# Patient Record
Sex: FEMALE | Race: WHITE | NOT HISPANIC OR LATINO | Employment: OTHER | URBAN - METROPOLITAN AREA
[De-identification: names, ages, dates, MRNs, and addresses within clinical notes are randomized per-mention and may not be internally consistent; named-entity substitution may affect disease eponyms.]

---

## 2023-03-13 DIAGNOSIS — R73.01 IFG (IMPAIRED FASTING GLUCOSE): Primary | ICD-10-CM

## 2023-03-13 RX ORDER — METFORMIN HYDROCHLORIDE 500 MG/1
500 TABLET, EXTENDED RELEASE ORAL 2 TIMES DAILY
COMMUNITY
End: 2023-03-13 | Stop reason: SDUPTHER

## 2023-03-15 DIAGNOSIS — R73.01 IFG (IMPAIRED FASTING GLUCOSE): ICD-10-CM

## 2023-03-15 RX ORDER — METFORMIN HYDROCHLORIDE 500 MG/1
500 TABLET, EXTENDED RELEASE ORAL 2 TIMES DAILY
Qty: 180 TABLET | Refills: 1 | Status: SHIPPED | OUTPATIENT
Start: 2023-03-15 | End: 2023-09-12 | Stop reason: SDUPTHER

## 2023-04-25 DIAGNOSIS — E11.9 TYPE 2 DIABETES MELLITUS WITHOUT COMPLICATION, WITHOUT LONG-TERM CURRENT USE OF INSULIN (MULTI): ICD-10-CM

## 2023-04-25 DIAGNOSIS — E03.9 HYPOTHYROIDISM, UNSPECIFIED TYPE: ICD-10-CM

## 2023-05-01 ENCOUNTER — LAB (OUTPATIENT)
Dept: LAB | Facility: LAB | Age: 53
End: 2023-05-01
Payer: COMMERCIAL

## 2023-05-01 DIAGNOSIS — E11.9 TYPE 2 DIABETES MELLITUS WITHOUT COMPLICATION, WITHOUT LONG-TERM CURRENT USE OF INSULIN (MULTI): ICD-10-CM

## 2023-05-01 DIAGNOSIS — E03.9 HYPOTHYROIDISM, UNSPECIFIED TYPE: ICD-10-CM

## 2023-05-01 LAB
ALANINE AMINOTRANSFERASE (SGPT) (U/L) IN SER/PLAS: 26 U/L (ref 7–45)
ALBUMIN (G/DL) IN SER/PLAS: 4.3 G/DL (ref 3.4–5)
ALKALINE PHOSPHATASE (U/L) IN SER/PLAS: 33 U/L (ref 33–110)
ANION GAP IN SER/PLAS: 13 MMOL/L (ref 10–20)
APPEARANCE, URINE: ABNORMAL
ASPARTATE AMINOTRANSFERASE (SGOT) (U/L) IN SER/PLAS: 20 U/L (ref 9–39)
BACTERIA, URINE: ABNORMAL /HPF
BASOPHILS (10*3/UL) IN BLOOD BY AUTOMATED COUNT: 0.04 X10E9/L (ref 0–0.1)
BASOPHILS/100 LEUKOCYTES IN BLOOD BY AUTOMATED COUNT: 0.5 % (ref 0–2)
BILIRUBIN TOTAL (MG/DL) IN SER/PLAS: 0.5 MG/DL (ref 0–1.2)
BILIRUBIN, URINE: NEGATIVE
BLOOD, URINE: NEGATIVE
CALCIUM (MG/DL) IN SER/PLAS: 9.2 MG/DL (ref 8.6–10.3)
CARBON DIOXIDE, TOTAL (MMOL/L) IN SER/PLAS: 26 MMOL/L (ref 21–32)
CHLORIDE (MMOL/L) IN SER/PLAS: 102 MMOL/L (ref 98–107)
CHOLESTEROL (MG/DL) IN SER/PLAS: 123 MG/DL (ref 0–199)
CHOLESTEROL IN HDL (MG/DL) IN SER/PLAS: 58.5 MG/DL
CHOLESTEROL/HDL RATIO: 2.1
COLOR, URINE: YELLOW
CREATININE (MG/DL) IN SER/PLAS: 1 MG/DL (ref 0.5–1.05)
EOSINOPHILS (10*3/UL) IN BLOOD BY AUTOMATED COUNT: 0.13 X10E9/L (ref 0–0.7)
EOSINOPHILS/100 LEUKOCYTES IN BLOOD BY AUTOMATED COUNT: 1.7 % (ref 0–6)
ERYTHROCYTE DISTRIBUTION WIDTH (RATIO) BY AUTOMATED COUNT: 12.5 % (ref 11.5–14.5)
ERYTHROCYTE MEAN CORPUSCULAR HEMOGLOBIN CONCENTRATION (G/DL) BY AUTOMATED: 32.6 G/DL (ref 32–36)
ERYTHROCYTE MEAN CORPUSCULAR VOLUME (FL) BY AUTOMATED COUNT: 93 FL (ref 80–100)
ERYTHROCYTES (10*6/UL) IN BLOOD BY AUTOMATED COUNT: 4.31 X10E12/L (ref 4–5.2)
ESTIMATED AVERAGE GLUCOSE FOR HBA1C: 128 MG/DL
GFR FEMALE: 67 ML/MIN/1.73M2
GLUCOSE (MG/DL) IN SER/PLAS: 100 MG/DL (ref 74–99)
GLUCOSE, URINE: NEGATIVE MG/DL
HEMATOCRIT (%) IN BLOOD BY AUTOMATED COUNT: 39.9 % (ref 36–46)
HEMOGLOBIN (G/DL) IN BLOOD: 13 G/DL (ref 12–16)
HEMOGLOBIN A1C/HEMOGLOBIN TOTAL IN BLOOD: 6.1 %
IMMATURE GRANULOCYTES/100 LEUKOCYTES IN BLOOD BY AUTOMATED COUNT: 0.3 % (ref 0–0.9)
KETONES, URINE: NEGATIVE MG/DL
LDL: 46 MG/DL (ref 0–99)
LEUKOCYTE ESTERASE, URINE: ABNORMAL
LEUKOCYTES (10*3/UL) IN BLOOD BY AUTOMATED COUNT: 7.6 X10E9/L (ref 4.4–11.3)
LYMPHOCYTES (10*3/UL) IN BLOOD BY AUTOMATED COUNT: 1.03 X10E9/L (ref 1.2–4.8)
LYMPHOCYTES/100 LEUKOCYTES IN BLOOD BY AUTOMATED COUNT: 13.6 % (ref 13–44)
MONOCYTES (10*3/UL) IN BLOOD BY AUTOMATED COUNT: 0.66 X10E9/L (ref 0.1–1)
MONOCYTES/100 LEUKOCYTES IN BLOOD BY AUTOMATED COUNT: 8.7 % (ref 2–10)
MUCUS, URINE: ABNORMAL /LPF
NEUTROPHILS (10*3/UL) IN BLOOD BY AUTOMATED COUNT: 5.7 X10E9/L (ref 1.2–7.7)
NEUTROPHILS/100 LEUKOCYTES IN BLOOD BY AUTOMATED COUNT: 75.2 % (ref 40–80)
NITRITE, URINE: NEGATIVE
PH, URINE: 6 (ref 5–8)
PLATELETS (10*3/UL) IN BLOOD AUTOMATED COUNT: 291 X10E9/L (ref 150–450)
POTASSIUM (MMOL/L) IN SER/PLAS: 4.4 MMOL/L (ref 3.5–5.3)
PROTEIN TOTAL: 6.6 G/DL (ref 6.4–8.2)
PROTEIN, URINE: NEGATIVE MG/DL
RBC, URINE: 3 /HPF (ref 0–5)
SODIUM (MMOL/L) IN SER/PLAS: 137 MMOL/L (ref 136–145)
SPECIFIC GRAVITY, URINE: 1.02 (ref 1–1.03)
SQUAMOUS EPITHELIAL CELLS, URINE: 5 /HPF
THYROTROPIN (MIU/L) IN SER/PLAS BY DETECTION LIMIT <= 0.05 MIU/L: 1.02 MIU/L (ref 0.44–3.98)
TRIGLYCERIDE (MG/DL) IN SER/PLAS: 94 MG/DL (ref 0–149)
UREA NITROGEN (MG/DL) IN SER/PLAS: 12 MG/DL (ref 6–23)
UROBILINOGEN, URINE: <2 MG/DL (ref 0–1.9)
VLDL: 19 MG/DL (ref 0–40)
WBC, URINE: 12 /HPF (ref 0–5)

## 2023-05-01 PROCEDURE — 80061 LIPID PANEL: CPT

## 2023-05-01 PROCEDURE — 36415 COLL VENOUS BLD VENIPUNCTURE: CPT

## 2023-05-01 PROCEDURE — 81001 URINALYSIS AUTO W/SCOPE: CPT

## 2023-05-01 PROCEDURE — 85025 COMPLETE CBC W/AUTO DIFF WBC: CPT

## 2023-05-01 PROCEDURE — 84443 ASSAY THYROID STIM HORMONE: CPT

## 2023-05-01 PROCEDURE — 80053 COMPREHEN METABOLIC PANEL: CPT

## 2023-05-01 PROCEDURE — 83036 HEMOGLOBIN GLYCOSYLATED A1C: CPT

## 2023-05-08 ENCOUNTER — OFFICE VISIT (OUTPATIENT)
Dept: PRIMARY CARE | Facility: CLINIC | Age: 53
End: 2023-05-08
Payer: COMMERCIAL

## 2023-05-08 VITALS
WEIGHT: 173.5 LBS | SYSTOLIC BLOOD PRESSURE: 126 MMHG | BODY MASS INDEX: 29.62 KG/M2 | DIASTOLIC BLOOD PRESSURE: 90 MMHG | HEART RATE: 91 BPM | HEIGHT: 64 IN | RESPIRATION RATE: 16 BRPM | OXYGEN SATURATION: 99 %

## 2023-05-08 DIAGNOSIS — Z12.11 SCREENING FOR COLON CANCER: Primary | ICD-10-CM

## 2023-05-08 DIAGNOSIS — R73.01 IFG (IMPAIRED FASTING GLUCOSE): ICD-10-CM

## 2023-05-08 PROBLEM — M54.17 LUMBOSACRAL NEURITIS: Status: ACTIVE | Noted: 2023-05-08

## 2023-05-08 PROBLEM — H93.8X1 SENSATION OF PLUGGED EAR ON RIGHT SIDE: Status: ACTIVE | Noted: 2023-05-08

## 2023-05-08 PROBLEM — J06.9 ACUTE URI: Status: ACTIVE | Noted: 2023-05-08

## 2023-05-08 PROBLEM — R13.10 DYSPHAGIA: Status: ACTIVE | Noted: 2023-05-08

## 2023-05-08 PROBLEM — E03.9 HYPOTHYROID: Status: ACTIVE | Noted: 2023-05-08

## 2023-05-08 PROBLEM — M79.10 MYALGIA: Status: ACTIVE | Noted: 2023-05-08

## 2023-05-08 PROBLEM — R73.9 BLOOD GLUCOSE ELEVATED: Status: ACTIVE | Noted: 2023-05-08

## 2023-05-08 PROBLEM — S89.90XA LEG INJURY: Status: ACTIVE | Noted: 2023-05-08

## 2023-05-08 PROBLEM — M25.569 JOINT PAIN, KNEE: Status: ACTIVE | Noted: 2023-05-08

## 2023-05-08 PROBLEM — H44.009 EYE INFECTION: Status: ACTIVE | Noted: 2023-05-08

## 2023-05-08 PROBLEM — E78.5 ELEVATED LIPIDS: Status: ACTIVE | Noted: 2023-05-08

## 2023-05-08 PROBLEM — F41.9 ANXIETY: Status: ACTIVE | Noted: 2023-05-08

## 2023-05-08 PROBLEM — E11.9 DIABETES MELLITUS TYPE 2, UNCOMPLICATED (MULTI): Status: ACTIVE | Noted: 2023-05-08

## 2023-05-08 PROBLEM — L30.9 DERMATITIS: Status: ACTIVE | Noted: 2023-05-08

## 2023-05-08 PROBLEM — J04.0 LARYNGITIS: Status: ACTIVE | Noted: 2023-05-08

## 2023-05-08 PROBLEM — N92.4 ABNORMAL PERIMENOPAUSAL BLEEDING: Status: ACTIVE | Noted: 2023-05-08

## 2023-05-08 PROCEDURE — 3074F SYST BP LT 130 MM HG: CPT | Performed by: INTERNAL MEDICINE

## 2023-05-08 PROCEDURE — 99396 PREV VISIT EST AGE 40-64: CPT | Performed by: INTERNAL MEDICINE

## 2023-05-08 PROCEDURE — 1036F TOBACCO NON-USER: CPT | Performed by: INTERNAL MEDICINE

## 2023-05-08 PROCEDURE — 3080F DIAST BP >= 90 MM HG: CPT | Performed by: INTERNAL MEDICINE

## 2023-05-08 PROCEDURE — 3044F HG A1C LEVEL LT 7.0%: CPT | Performed by: INTERNAL MEDICINE

## 2023-05-08 RX ORDER — LEVOTHYROXINE SODIUM 75 UG/1
75 TABLET ORAL DAILY
COMMUNITY
End: 2023-09-12 | Stop reason: SDUPTHER

## 2023-05-08 RX ORDER — ALBUTEROL SULFATE 90 UG/1
AEROSOL, METERED RESPIRATORY (INHALATION)
COMMUNITY
Start: 2019-09-24

## 2023-05-08 RX ORDER — HYDROCORTISONE 25 MG/G
CREAM TOPICAL
COMMUNITY
Start: 2022-12-07

## 2023-05-08 RX ORDER — ACETAMINOPHEN 500 MG
1 TABLET ORAL DAILY
COMMUNITY
Start: 2022-06-27

## 2023-05-08 RX ORDER — TOPIRAMATE 25 MG/1
25 TABLET ORAL 2 TIMES DAILY
COMMUNITY
Start: 2019-06-04 | End: 2024-05-24 | Stop reason: ALTCHOICE

## 2023-05-08 RX ORDER — NORETHINDRONE ACETATE AND ETHINYL ESTRADIOL, ETHINYL ESTRADIOL AND FERROUS FUMARATE 1MG-10(24)
KIT ORAL
COMMUNITY

## 2023-05-08 RX ORDER — CLONAZEPAM 0.25 MG/1
TABLET, ORALLY DISINTEGRATING ORAL
COMMUNITY
Start: 2019-05-18 | End: 2024-05-24 | Stop reason: ALTCHOICE

## 2023-05-08 RX ORDER — NITROFURANTOIN 25; 75 MG/1; MG/1
100 CAPSULE ORAL 2 TIMES DAILY
Qty: 10 CAPSULE | Refills: 0 | Status: SHIPPED | OUTPATIENT
Start: 2023-05-08 | End: 2023-05-17 | Stop reason: SDUPTHER

## 2023-05-08 ASSESSMENT — ENCOUNTER SYMPTOMS
LOSS OF SENSATION IN FEET: 0
DEPRESSION: 0
OCCASIONAL FEELINGS OF UNSTEADINESS: 0

## 2023-05-08 ASSESSMENT — PATIENT HEALTH QUESTIONNAIRE - PHQ9
SUM OF ALL RESPONSES TO PHQ9 QUESTIONS 1 AND 2: 0
1. LITTLE INTEREST OR PLEASURE IN DOING THINGS: NOT AT ALL
2. FEELING DOWN, DEPRESSED OR HOPELESS: NOT AT ALL

## 2023-05-08 NOTE — PROGRESS NOTES
"Subjective   Patient ID: Madie Jones is a 52 y.o. female who presents for Annual Exam.    CPE :    52 F    Doing well    IFG-HBA1C= 6.1    Hypothyroid    Covid x 2    HPI     Review of Systems      No Fever/chills/headaches/dizziness/chest pains/ shortness of breath/palpitations/Nausea/vomiting/diarrhea/ constipation/urine frequency/blood in urine.    Dark urine-     Objective   /90 (BP Location: Left arm, Patient Position: Sitting, BP Cuff Size: Adult)   Pulse 91   Resp 16   Ht 1.635 m (5' 4.37\")   Wt 78.7 kg (173 lb 8 oz)   SpO2 99%   BMI 29.44 kg/m²     Physical Exam  No JVP elevation. No palpable Lymph Nodes. No Thyromegaly    CVS-NL S1/S2 . No MRG    Lungs-CTA. B/S= B/L    Abdomen-Soft, Non-tender. No masses or HSM    Extremities: No C/C/E        Assessment/Plan        IFG-HBA1C= 6.1    Hypothyroid    Covid x 2    Plan:    CRC    Macrobid-100 mg BID X 5 days    Continue with Metformin  mg BID    ? Prevnar 20    Gyn/Mammogram    Continue with current Rx    Follow up in 12 months/PRN      "

## 2023-05-17 ENCOUNTER — TELEPHONE (OUTPATIENT)
Dept: PRIMARY CARE | Facility: CLINIC | Age: 53
End: 2023-05-17
Payer: COMMERCIAL

## 2023-05-17 DIAGNOSIS — Z12.11 SCREENING FOR COLON CANCER: ICD-10-CM

## 2023-05-17 RX ORDER — NITROFURANTOIN 25; 75 MG/1; MG/1
100 CAPSULE ORAL 2 TIMES DAILY
Qty: 14 CAPSULE | Refills: 0 | Status: SHIPPED | OUTPATIENT
Start: 2023-05-17 | End: 2023-05-24

## 2023-05-17 NOTE — TELEPHONE ENCOUNTER
Patient said at last appointment on May 8th she was given UTI medication and still is experiencing symptoms, irritation while urinating. Patient states she is going out of town tomorrow so she would like additional medication called in as soon as possible    Please call and advise patient [phone]    Nash Tremaine Gargdhurst

## 2023-08-31 RX ORDER — TRIAMCINOLONE ACETONIDE 55 UG/1
1 SPRAY, METERED NASAL DAILY
COMMUNITY
Start: 2018-10-08

## 2023-08-31 RX ORDER — CEPHALEXIN 250 MG/1
1 CAPSULE ORAL EVERY 12 HOURS
COMMUNITY
Start: 2022-10-10 | End: 2024-05-24 | Stop reason: ALTCHOICE

## 2023-09-12 ENCOUNTER — TELEPHONE (OUTPATIENT)
Dept: PRIMARY CARE | Facility: CLINIC | Age: 53
End: 2023-09-12
Payer: COMMERCIAL

## 2023-09-12 DIAGNOSIS — E78.5 ELEVATED LIPIDS: ICD-10-CM

## 2023-09-12 DIAGNOSIS — R73.01 IFG (IMPAIRED FASTING GLUCOSE): ICD-10-CM

## 2023-09-12 RX ORDER — METFORMIN HYDROCHLORIDE 500 MG/1
500 TABLET, EXTENDED RELEASE ORAL 2 TIMES DAILY
Qty: 180 TABLET | Refills: 3 | Status: SHIPPED | OUTPATIENT
Start: 2023-09-12 | End: 2024-05-24 | Stop reason: SDUPTHER

## 2023-09-12 RX ORDER — LEVOTHYROXINE SODIUM 75 UG/1
75 TABLET ORAL DAILY
Qty: 90 TABLET | Refills: 3 | Status: SHIPPED | OUTPATIENT
Start: 2023-09-12 | End: 2024-09-11

## 2023-09-12 NOTE — TELEPHONE ENCOUNTER
PT called and stated she had called about her prescription that has been coming through Dr HSU from her endocrinologist.    RX: Metformin 500 mg

## 2023-11-30 ENCOUNTER — ANCILLARY PROCEDURE (OUTPATIENT)
Dept: RADIOLOGY | Facility: CLINIC | Age: 53
End: 2023-11-30
Payer: COMMERCIAL

## 2023-11-30 DIAGNOSIS — Z12.39 ENCOUNTER FOR OTHER SCREENING FOR MALIGNANT NEOPLASM OF BREAST: ICD-10-CM

## 2023-11-30 PROCEDURE — 77067 SCR MAMMO BI INCL CAD: CPT | Mod: BILATERAL PROCEDURE | Performed by: RADIOLOGY

## 2023-11-30 PROCEDURE — 77067 SCR MAMMO BI INCL CAD: CPT

## 2023-11-30 PROCEDURE — 77063 BREAST TOMOSYNTHESIS BI: CPT | Mod: BILATERAL PROCEDURE | Performed by: RADIOLOGY

## 2023-12-04 DIAGNOSIS — Z12.11 COLON CANCER SCREENING: Primary | ICD-10-CM

## 2023-12-04 RX ORDER — SODIUM, POTASSIUM,MAG SULFATES 17.5-3.13G
SOLUTION, RECONSTITUTED, ORAL ORAL
Qty: 1 EACH | Refills: 0 | Status: SHIPPED | OUTPATIENT
Start: 2023-12-04 | End: 2024-04-23 | Stop reason: SDUPTHER

## 2023-12-11 ENCOUNTER — HOSPITAL ENCOUNTER (OUTPATIENT)
Dept: GASTROENTEROLOGY | Facility: HOSPITAL | Age: 53
Setting detail: OUTPATIENT SURGERY
Discharge: HOME | End: 2023-12-11
Payer: COMMERCIAL

## 2023-12-11 ENCOUNTER — ANESTHESIA (OUTPATIENT)
Dept: GASTROENTEROLOGY | Facility: HOSPITAL | Age: 53
End: 2023-12-11
Payer: COMMERCIAL

## 2023-12-11 ENCOUNTER — ANESTHESIA EVENT (OUTPATIENT)
Dept: GASTROENTEROLOGY | Facility: HOSPITAL | Age: 53
End: 2023-12-11
Payer: COMMERCIAL

## 2023-12-11 VITALS
DIASTOLIC BLOOD PRESSURE: 74 MMHG | WEIGHT: 180 LBS | RESPIRATION RATE: 16 BRPM | OXYGEN SATURATION: 100 % | TEMPERATURE: 97.2 F | BODY MASS INDEX: 30.73 KG/M2 | HEART RATE: 64 BPM | SYSTOLIC BLOOD PRESSURE: 121 MMHG | HEIGHT: 64 IN

## 2023-12-11 DIAGNOSIS — Z12.11 SCREENING FOR COLON CANCER: ICD-10-CM

## 2023-12-11 DIAGNOSIS — Z83.719 FAMILY HX COLONIC POLYPS: Primary | ICD-10-CM

## 2023-12-11 PROBLEM — R11.2 PONV (POSTOPERATIVE NAUSEA AND VOMITING): Status: ACTIVE | Noted: 2023-12-11

## 2023-12-11 PROBLEM — Z98.890 PONV (POSTOPERATIVE NAUSEA AND VOMITING): Status: ACTIVE | Noted: 2023-12-11

## 2023-12-11 LAB
GLUCOSE BLD MANUAL STRIP-MCNC: 107 MG/DL (ref 74–99)
GLUCOSE BLD MANUAL STRIP-MCNC: 111 MG/DL (ref 74–99)

## 2023-12-11 PROCEDURE — 7100000010 HC PHASE TWO TIME - EACH INCREMENTAL 1 MINUTE

## 2023-12-11 PROCEDURE — 82947 ASSAY GLUCOSE BLOOD QUANT: CPT

## 2023-12-11 PROCEDURE — 2720000007 HC OR 272 NO HCPCS

## 2023-12-11 PROCEDURE — 88305 TISSUE EXAM BY PATHOLOGIST: CPT | Performed by: PATHOLOGY

## 2023-12-11 PROCEDURE — A45378 PR COLONOSCOPY,DIAGNOSTIC: Performed by: ANESTHESIOLOGIST ASSISTANT

## 2023-12-11 PROCEDURE — 2500000004 HC RX 250 GENERAL PHARMACY W/ HCPCS (ALT 636 FOR OP/ED): Performed by: ANESTHESIOLOGIST ASSISTANT

## 2023-12-11 PROCEDURE — 45385 COLONOSCOPY W/LESION REMOVAL: CPT | Performed by: INTERNAL MEDICINE

## 2023-12-11 PROCEDURE — A45378 PR COLONOSCOPY,DIAGNOSTIC: Performed by: STUDENT IN AN ORGANIZED HEALTH CARE EDUCATION/TRAINING PROGRAM

## 2023-12-11 PROCEDURE — 45390 COLONOSCOPY W/RESECTION: CPT | Performed by: INTERNAL MEDICINE

## 2023-12-11 PROCEDURE — 0753T DGTZ GLS MCRSCP SLD LEVEL IV: CPT | Mod: TC,SUR,AHULAB | Performed by: INTERNAL MEDICINE

## 2023-12-11 PROCEDURE — 2500000005 HC RX 250 GENERAL PHARMACY W/O HCPCS: Performed by: ANESTHESIOLOGIST ASSISTANT

## 2023-12-11 PROCEDURE — 3700000002 HC GENERAL ANESTHESIA TIME - EACH INCREMENTAL 1 MINUTE

## 2023-12-11 PROCEDURE — 3700000001 HC GENERAL ANESTHESIA TIME - INITIAL BASE CHARGE

## 2023-12-11 PROCEDURE — 7100000009 HC PHASE TWO TIME - INITIAL BASE CHARGE

## 2023-12-11 RX ORDER — ONDANSETRON HYDROCHLORIDE 2 MG/ML
INJECTION, SOLUTION INTRAVENOUS AS NEEDED
Status: DISCONTINUED | OUTPATIENT
Start: 2023-12-11 | End: 2023-12-11

## 2023-12-11 RX ORDER — PROPOFOL 10 MG/ML
INJECTION, EMULSION INTRAVENOUS CONTINUOUS PRN
Status: DISCONTINUED | OUTPATIENT
Start: 2023-12-11 | End: 2023-12-11

## 2023-12-11 RX ORDER — SODIUM CHLORIDE, SODIUM LACTATE, POTASSIUM CHLORIDE, CALCIUM CHLORIDE 600; 310; 30; 20 MG/100ML; MG/100ML; MG/100ML; MG/100ML
20 INJECTION, SOLUTION INTRAVENOUS CONTINUOUS
Status: DISCONTINUED | OUTPATIENT
Start: 2023-12-11 | End: 2023-12-12 | Stop reason: HOSPADM

## 2023-12-11 RX ORDER — MIDAZOLAM HYDROCHLORIDE 1 MG/ML
INJECTION INTRAMUSCULAR; INTRAVENOUS AS NEEDED
Status: DISCONTINUED | OUTPATIENT
Start: 2023-12-11 | End: 2023-12-11

## 2023-12-11 RX ORDER — LIDOCAINE HYDROCHLORIDE 20 MG/ML
INJECTION, SOLUTION EPIDURAL; INFILTRATION; INTRACAUDAL; PERINEURAL AS NEEDED
Status: DISCONTINUED | OUTPATIENT
Start: 2023-12-11 | End: 2023-12-11

## 2023-12-11 RX ADMIN — LIDOCAINE HYDROCHLORIDE 100 MG: 20 INJECTION, SOLUTION EPIDURAL; INFILTRATION; INTRACAUDAL; PERINEURAL at 10:26

## 2023-12-11 RX ADMIN — MIDAZOLAM HYDROCHLORIDE 2 MG: 1 INJECTION INTRAMUSCULAR; INTRAVENOUS at 10:23

## 2023-12-11 RX ADMIN — SODIUM CHLORIDE, SODIUM LACTATE, POTASSIUM CHLORIDE, AND CALCIUM CHLORIDE: 600; 310; 30; 20 INJECTION, SOLUTION INTRAVENOUS at 10:22

## 2023-12-11 RX ADMIN — GLYCOPYRROLATE 0.1 MCG: 0.2 INJECTION, SOLUTION INTRAMUSCULAR; INTRAVITREAL at 10:23

## 2023-12-11 RX ADMIN — PROPOFOL 175 MCG/KG/MIN: 10 INJECTION, EMULSION INTRAVENOUS at 10:26

## 2023-12-11 RX ADMIN — ONDANSETRON 4 MG: 2 INJECTION, SOLUTION INTRAMUSCULAR; INTRAVENOUS at 10:51

## 2023-12-11 SDOH — HEALTH STABILITY: MENTAL HEALTH: CURRENT SMOKER: 0

## 2023-12-11 ASSESSMENT — PAIN SCALES - GENERAL
PAINLEVEL_OUTOF10: 0 - NO PAIN

## 2023-12-11 ASSESSMENT — COLUMBIA-SUICIDE SEVERITY RATING SCALE - C-SSRS
6. HAVE YOU EVER DONE ANYTHING, STARTED TO DO ANYTHING, OR PREPARED TO DO ANYTHING TO END YOUR LIFE?: NO
1. IN THE PAST MONTH, HAVE YOU WISHED YOU WERE DEAD OR WISHED YOU COULD GO TO SLEEP AND NOT WAKE UP?: NO
2. HAVE YOU ACTUALLY HAD ANY THOUGHTS OF KILLING YOURSELF?: NO

## 2023-12-11 ASSESSMENT — PAIN - FUNCTIONAL ASSESSMENT
PAIN_FUNCTIONAL_ASSESSMENT: 0-10

## 2023-12-11 ASSESSMENT — ENCOUNTER SYMPTOMS: CONSTITUTIONAL NEGATIVE: 1

## 2023-12-11 NOTE — POST-PROCEDURE NOTE
1053: Patient to bay with anesthesia present, plan of care reviewed. Report received from TINY BERRY. Initial assessment complete.    1100: Dr. Sandy to bedside to speak with pt.    1105: pt family back to bedside. Pt tolerating kari crackers and sips of water. Post procedure .    1123: discharge instructions reviewed with pt and family. Pt and family verbalized understanding.    1125: pt family assisted pt with dressing.     1130: IV removed without difficulty.  pt tolerated well. Cath intact upon removal.    1133:pt transported to [facility] via

## 2023-12-11 NOTE — ANESTHESIA PREPROCEDURE EVALUATION
Patient: Madie Jones    Procedure Information       Date/Time: 12/11/23 1050    Scheduled providers: James Sandy DO; Pita New MA; Jenn Kaur MD; LUIS MANUEL Maldonado; Ml Soto RN    Procedure: COLONOSCOPY    Location: [facility]            Relevant Problems   Anesthesia   (+) PONV (postoperative nausea and vomiting)      Cardiovascular (within normal limits)      Endocrine   (+) Diabetes mellitus type 2, uncomplicated (CMS/HCC)   (+) Hypothyroid      /Renal (within normal limits)      Neuro/Psych   (+) Anxiety   (+) Lumbosacral neuritis      Infectious Disease   (+) Acute URI   (+) Eye infection       Clinical information reviewed:   Tobacco  Allergies  Meds   Med Hx  Surg Hx   Fam Hx  Soc Hx        NPO Detail:  NPO/Void Status  Date of Last Liquid: 12/11/23  Time of Last Liquid: 0450  Date of Last Solid: 12/10/23  Time of Last Solid: 0800  Last Intake Type: GI prep  Time of Last Void: 0600       There were no vitals filed for this visit.    Past Surgical History:   Procedure Laterality Date    BREAST SURGERY  01/27/2016    Breast Surgery Reduction Procedure Bilateral    CHOLECYSTECTOMY  01/27/2016    Cholecystectomy    MOUTH SURGERY  01/27/2016    Oral Surgery Tooth Extraction    OTHER SURGICAL HISTORY  01/27/2016    Repair Of Superficial Wound Upper Extrem Finger(S)     Past Medical History:   Diagnosis Date    Abnormal weight gain 11/17/2018    Abnormal weight gain    History of bilateral breast reduction surgery 2016    Parvovirus infection, unspecified     Parvovirus infection    Personal history of other diseases of the musculoskeletal system and connective tissue 08/02/2018    History of joint pain    Personal history of other diseases of the respiratory system 01/07/2015    History of pharyngitis    Personal history of other diseases of the respiratory system 08/02/2018    History of sore throat    Personal history of other infectious and  parasitic diseases 05/04/2016    History of herpes zoster    PONV (postoperative nausea and vomiting)        Current Outpatient Medications:     cholecalciferol (Vitamin D-3) 50 mcg (2,000 unit) capsule, Take 1 capsule (50 mcg) by mouth once daily., Disp: , Rfl:     L. acidophilus/Bifid. animalis 32 billion cell capsule, Take by mouth., Disp: , Rfl:     Lactobacillus acidophilus (PROBIOTIC ORAL), Take by mouth. Use as directed, Disp: , Rfl:     levothyroxine (Synthroid) 75 mcg tablet, Take 1 tablet (75 mcg) by mouth once daily., Disp: 90 tablet, Rfl: 3    Lo Loestrin Fe 1 mg-10 mcg (24)/10 mcg (2) tablet, TAKE ONE TABLET BY MOUTH EVERY DAY. SKIP INACTIVE WEEK, Disp: , Rfl:     metFORMIN  mg 24 hr tablet, Take 1 tablet (500 mg) by mouth 2 times a day., Disp: 180 tablet, Rfl: 3    sodium,potassium,mag sulfates (Suprep Bowel Prep Kit) 17.5-3.13-1.6 gram recon soln solution, Take one bottle twice as directed by the prep instructions, Disp: 1 each, Rfl: 0    triamcinolone (Nasacort) 55 mcg nasal inhaler, Administer 1 spray into affected nostril(s) once daily., Disp: , Rfl:     albuterol 90 mcg/actuation inhaler, Inhale., Disp: , Rfl:     cephalexin (Keflex) 250 mg capsule, Take 1 capsule (250 mg) by mouth every 12 hours., Disp: , Rfl:     clonazePAM (KlonoPIN) 0.25 mg disintegrating tablet, DISSOLVE 1 TO 2 TABLETS IN MOUTH AT BEDTIME AS NEEDED., Disp: , Rfl:     hydrocortisone 2.5 % cream, APPLY TO RASH ON FACE 2 TIMES A DAY AS NEEDED FOR 2 WEEKS, Disp: , Rfl:     topiramate (Topamax) 25 mg tablet, Take 1 tablet (25 mg) by mouth twice a day., Disp: , Rfl:     Current Facility-Administered Medications:     lactated Ringer's infusion, 20 mL/hr, intravenous, Continuous, James Sandy, DO  Prior to Admission medications    Medication Sig Start Date End Date Taking? Authorizing Provider   cholecalciferol (Vitamin D-3) 50 mcg (2,000 unit) capsule Take 1 capsule (50 mcg) by mouth once daily. 6/27/22  Yes Historical  Provider, MD   L. acidophilus/Bifid. animalis 32 billion cell capsule Take by mouth. 10/8/18  Yes Historical Provider, MD   Lactobacillus acidophilus (PROBIOTIC ORAL) Take by mouth. Use as directed   Yes Historical Provider, MD   levothyroxine (Synthroid) 75 mcg tablet Take 1 tablet (75 mcg) by mouth once daily. 9/12/23 9/11/24 Yes Harsha Robins MD   Lo Loestrin Fe 1 mg-10 mcg (24)/10 mcg (2) tablet TAKE ONE TABLET BY MOUTH EVERY DAY. SKIP INACTIVE WEEK   Yes Historical Provider, MD   metFORMIN  mg 24 hr tablet Take 1 tablet (500 mg) by mouth 2 times a day. 9/12/23 9/11/24 Yes Harsha Robins MD   sodium,potassium,mag sulfates (Suprep Bowel Prep Kit) 17.5-3.13-1.6 gram recon soln solution Take one bottle twice as directed by the prep instructions 12/4/23  Yes James Sandy, DO   triamcinolone (Nasacort) 55 mcg nasal inhaler Administer 1 spray into affected nostril(s) once daily. 10/8/18  Yes Historical Provider, MD   albuterol 90 mcg/actuation inhaler Inhale. 9/24/19   Historical Provider, MD   cephalexin (Keflex) 250 mg capsule Take 1 capsule (250 mg) by mouth every 12 hours. 10/10/22   Historical Provider, MD   clonazePAM (KlonoPIN) 0.25 mg disintegrating tablet DISSOLVE 1 TO 2 TABLETS IN MOUTH AT BEDTIME AS NEEDED. 5/18/19   Historical Provider, MD   hydrocortisone 2.5 % cream APPLY TO RASH ON FACE 2 TIMES A DAY AS NEEDED FOR 2 WEEKS 12/7/22   Historical Provider, MD   topiramate (Topamax) 25 mg tablet Take 1 tablet (25 mg) by mouth twice a day. 6/4/19   Historical Provider, MD     Allergies   Allergen Reactions    Liraglutide Anxiety, Diarrhea and Nausea Only     Social History     Tobacco Use    Smoking status: Never    Smokeless tobacco: Never   Substance Use Topics    Alcohol use: Yes     Alcohol/week: 1.0 standard drink of alcohol     Types: 1 Shots of liquor per week     Comment: once a week         Chemistry    Lab Results   Component Value Date/Time     05/01/2023 1128    K 4.4 05/01/2023  "1128     05/01/2023 1128    CO2 26 05/01/2023 1128    BUN 12 05/01/2023 1128    CREATININE 1.00 05/01/2023 1128    Lab Results   Component Value Date/Time    CALCIUM 9.2 05/01/2023 1128    ALKPHOS 33 05/01/2023 1128    AST 20 05/01/2023 1128    ALT 26 05/01/2023 1128    BILITOT 0.5 05/01/2023 1128          Lab Results   Component Value Date/Time    WBC 7.6 05/01/2023 1128    HGB 13.0 05/01/2023 1128    HCT 39.9 05/01/2023 1128     05/01/2023 1128     No results found for: \"PROTIME\", \"PTT\", \"INR\"  No results found for this or any previous visit (from the past 4464 hour(s)).  No results found for this or any previous visit from the past 1095 days.    Physical Exam    Airway  Mallampati: II  TM distance: >3 FB  Neck ROM: full     Cardiovascular - normal exam     Dental    Pulmonary - normal exam     Abdominal - normal exam             Anesthesia Plan    ASA 2     MAC     The patient is not a current smoker.    intravenous induction   Anesthetic plan and risks discussed with patient.  Use of blood products discussed with patient who.    Plan discussed with CAA and attending.      "

## 2023-12-11 NOTE — DISCHARGE INSTRUCTIONS

## 2023-12-11 NOTE — ANESTHESIA POSTPROCEDURE EVALUATION
Patient: Madie Jones    Procedure Summary       Date: 12/11/23 Room / Location: [facility]    Anesthesia Start: 1022 Anesthesia Stop: 1057    Procedure: COLONOSCOPY Diagnosis:       Screening for colon cancer      Family hx colonic polyps    Scheduled Providers: James Sandy DO; Pita New MA; Jenn Kaur MD; LUIS MANUEL Maldonado; Ml Soto RN Responsible Provider: Jenn Kaur MD    Anesthesia Type: MAC ASA Status: 2            Anesthesia Type: MAC    Vitals Value Taken Time   /74 12/11/23 1125   Temp 36.2 °C (97.2 °F) 12/11/23 1053   Pulse 59 12/11/23 1125   Resp 16 12/11/23 1123   SpO2 97 % 12/11/23 1125   Vitals shown include unvalidated device data.    Anesthesia Post Evaluation    Patient location during evaluation: PACU  Patient participation: complete - patient participated  Level of consciousness: awake and alert  Pain management: adequate  Airway patency: patent  Cardiovascular status: acceptable  Respiratory status: acceptable  Hydration status: acceptable  Postoperative Nausea and Vomiting: none  Comments: Denies nausea         There were no known notable events for this encounter.

## 2023-12-11 NOTE — H&P
History Of Present Illness  Madie Jones is a 53 y.o. female presenting with open access screening colonoscopy.  One of her parents had colon polyps; no cancer.     Past Medical History  Past Medical History:   Diagnosis Date    Abnormal weight gain 11/17/2018    Abnormal weight gain    History of bilateral breast reduction surgery 2016    Parvovirus infection, unspecified     Parvovirus infection    Personal history of other diseases of the musculoskeletal system and connective tissue 08/02/2018    History of joint pain    Personal history of other diseases of the respiratory system 01/07/2015    History of pharyngitis    Personal history of other diseases of the respiratory system 08/02/2018    History of sore throat    Personal history of other infectious and parasitic diseases 05/04/2016    History of herpes zoster    PONV (postoperative nausea and vomiting)        Surgical History  Past Surgical History:   Procedure Laterality Date    BREAST SURGERY  01/27/2016    Breast Surgery Reduction Procedure Bilateral    CHOLECYSTECTOMY  01/27/2016    Cholecystectomy    MOUTH SURGERY  01/27/2016    Oral Surgery Tooth Extraction    OTHER SURGICAL HISTORY  01/27/2016    Repair Of Superficial Wound Upper Extrem Finger(S)        Social History  She reports that she has never smoked. She has never used smokeless tobacco. She reports current alcohol use of about 1.0 standard drink of alcohol per week. She reports that she does not use drugs.    Family History  Family History   Problem Relation Name Age of Onset    Hypothyroidism Mother      Other (cardiac arrythmia) Father      Skin cancer Father      Colon cancer Paternal Grandfather          Allergies  Liraglutide    Review of Systems   Constitutional: Negative.         Physical Exam  Constitutional:       Appearance: Normal appearance.   Cardiovascular:      Rate and Rhythm: Normal rate and regular rhythm.   Pulmonary:      Effort: Pulmonary effort is normal.      Breath  sounds: Normal breath sounds.   Abdominal:      Palpations: Abdomen is soft.   Neurological:      Mental Status: She is alert.          Last Recorded Vitals  There were no vitals taken for this visit.    Relevant Results             Assessment/Plan   Active Problems:  There are no active Hospital Problems.      Colonoscopy for family history of polyps and CRC screening       I spent 10 minutes in the professional and overall care of this patient.      James Sandy, DO

## 2023-12-12 ASSESSMENT — PAIN SCALES - GENERAL: PAINLEVEL_OUTOF10: 0 - NO PAIN

## 2023-12-15 LAB
LABORATORY COMMENT REPORT: NORMAL
PATH REPORT.FINAL DX SPEC: NORMAL
PATH REPORT.GROSS SPEC: NORMAL
PATH REPORT.TOTAL CANCER: NORMAL

## 2024-01-09 ENCOUNTER — OFFICE VISIT (OUTPATIENT)
Dept: SPORTS MEDICINE | Facility: HOSPITAL | Age: 54
End: 2024-01-09
Payer: COMMERCIAL

## 2024-01-09 DIAGNOSIS — M54.16 RADICULOPATHY OF LUMBAR REGION: Primary | ICD-10-CM

## 2024-01-09 PROCEDURE — 99213 OFFICE O/P EST LOW 20 MIN: CPT | Performed by: PHYSICIAN ASSISTANT

## 2024-01-09 PROCEDURE — 1036F TOBACCO NON-USER: CPT | Performed by: PHYSICIAN ASSISTANT

## 2024-01-09 RX ORDER — METHYLPREDNISOLONE 4 MG/1
TABLET ORAL
Qty: 1 EACH | Refills: 0 | Status: SHIPPED | OUTPATIENT
Start: 2024-01-09 | End: 2024-05-24 | Stop reason: ALTCHOICE

## 2024-01-09 NOTE — PROGRESS NOTES
Madie is a 53-year-old female reporting to clinic today for evaluation of her acute medial thigh pain.    Her symptoms started spontaneously 5 days ago, she denies injury or trauma.  She has pinpoint right medial thigh pain that she describes as a constant burning sensation.  She has found nothing that increases or decreases her symptoms.  She denies radiation of her pain.  She has no back pain.  No numbness and tingling.  She denies weakness, dry or tripping over her feet.  She has full control of her bowel and bladder.    She has a self-reported history of an L4-5 disc herniation.  This pain is similar to her previous nerve pain but in a different location.    Family, social, and medical histories are obtained and reviewed.  Prediabetic on metformin.    ROS: All other systems have been reviewed and are negative except as previously noted in history of present illness.    Const: Well-appearing, well-nourished female in no distress.  Eyes: Normal appearing sclera and conjunctiva, no jaundice, pupils normal in appearance.  Resp: breathing comfortably, normal respiratory rate.  CV: No upper or lower extremity edema.  Musculoskeletal: Normal gait.  Lumbar ROM is supple.  Strength exam of the lower extremities reveals 5/5 strength in all major muscle groups.  Negative straight leg raise bilaterally.  No swelling, bruising, or tenderness to palpation.  Neuro: Sensation is intact and equal bilaterally. Deep tendon reflexes are normal and symmetric.  No clonus.  Skin: Intact without any lesions, normal turgor.  Psych: Alert and oriented x3, normal mood and affect.    Her concern was ruling out a blood clot.  I discussed with her that I cannot rule this out here in clinic.  My suspicion for a blood clot is low.  I did discuss symptoms with her to look out for and immediately report to the ED if these are present.  In the meantime we are going to treat her acute radicular pain conservatively.  She was prescribed a Medrol  Dosepak for inflammation.  I am optimistic her symptoms will improve with conservative treatment.  She can follow-up with me on an as-needed basis.    **This note was dictated using speech recognition software and was not corrected for spelling or grammatical errors**

## 2024-04-23 DIAGNOSIS — Z12.11 COLON CANCER SCREENING: ICD-10-CM

## 2024-04-23 RX ORDER — SODIUM, POTASSIUM,MAG SULFATES 17.5-3.13G
SOLUTION, RECONSTITUTED, ORAL ORAL
Qty: 1 EACH | Refills: 0 | Status: SHIPPED | OUTPATIENT
Start: 2024-04-23

## 2024-05-10 ENCOUNTER — APPOINTMENT (OUTPATIENT)
Dept: PRIMARY CARE | Facility: CLINIC | Age: 54
End: 2024-05-10
Payer: COMMERCIAL

## 2024-05-17 ENCOUNTER — LAB (OUTPATIENT)
Dept: LAB | Facility: LAB | Age: 54
End: 2024-05-17
Payer: COMMERCIAL

## 2024-05-17 DIAGNOSIS — Z00.00 ROUTINE GENERAL MEDICAL EXAMINATION AT A HEALTH CARE FACILITY: ICD-10-CM

## 2024-05-17 LAB
ALBUMIN SERPL BCP-MCNC: 4.3 G/DL (ref 3.4–5)
ALP SERPL-CCNC: 34 U/L (ref 33–110)
ALT SERPL W P-5'-P-CCNC: 22 U/L (ref 7–45)
ANION GAP SERPL CALC-SCNC: 13 MMOL/L (ref 10–20)
APPEARANCE UR: ABNORMAL
AST SERPL W P-5'-P-CCNC: 16 U/L (ref 9–39)
BACTERIA #/AREA URNS AUTO: ABNORMAL /HPF
BASOPHILS # BLD AUTO: 0.06 X10*3/UL (ref 0–0.1)
BASOPHILS NFR BLD AUTO: 1 %
BILIRUB SERPL-MCNC: 0.6 MG/DL (ref 0–1.2)
BILIRUB UR STRIP.AUTO-MCNC: NEGATIVE MG/DL
BUN SERPL-MCNC: 17 MG/DL (ref 6–23)
CALCIUM SERPL-MCNC: 8.9 MG/DL (ref 8.6–10.3)
CHLORIDE SERPL-SCNC: 105 MMOL/L (ref 98–107)
CHOLEST SERPL-MCNC: 126 MG/DL (ref 0–199)
CHOLESTEROL/HDL RATIO: 2.3
CO2 SERPL-SCNC: 26 MMOL/L (ref 21–32)
COLOR UR: YELLOW
CREAT SERPL-MCNC: 1.05 MG/DL (ref 0.5–1.05)
EGFRCR SERPLBLD CKD-EPI 2021: 64 ML/MIN/1.73M*2
EOSINOPHIL # BLD AUTO: 0.25 X10*3/UL (ref 0–0.7)
EOSINOPHIL NFR BLD AUTO: 4.1 %
ERYTHROCYTE [DISTWIDTH] IN BLOOD BY AUTOMATED COUNT: 12.9 % (ref 11.5–14.5)
EST. AVERAGE GLUCOSE BLD GHB EST-MCNC: 134 MG/DL
GLUCOSE SERPL-MCNC: 114 MG/DL (ref 74–99)
GLUCOSE UR STRIP.AUTO-MCNC: NORMAL MG/DL
HBA1C MFR BLD: 6.3 %
HCT VFR BLD AUTO: 39.9 % (ref 36–46)
HDLC SERPL-MCNC: 54.8 MG/DL
HGB BLD-MCNC: 12.9 G/DL (ref 12–16)
IMM GRANULOCYTES # BLD AUTO: 0.02 X10*3/UL (ref 0–0.7)
IMM GRANULOCYTES NFR BLD AUTO: 0.3 % (ref 0–0.9)
KETONES UR STRIP.AUTO-MCNC: NEGATIVE MG/DL
LDLC SERPL CALC-MCNC: 53 MG/DL
LEUKOCYTE ESTERASE UR QL STRIP.AUTO: ABNORMAL
LYMPHOCYTES # BLD AUTO: 1.56 X10*3/UL (ref 1.2–4.8)
LYMPHOCYTES NFR BLD AUTO: 25.4 %
MCH RBC QN AUTO: 30.1 PG (ref 26–34)
MCHC RBC AUTO-ENTMCNC: 32.3 G/DL (ref 32–36)
MCV RBC AUTO: 93 FL (ref 80–100)
MONOCYTES # BLD AUTO: 0.41 X10*3/UL (ref 0.1–1)
MONOCYTES NFR BLD AUTO: 6.7 %
MUCOUS THREADS #/AREA URNS AUTO: ABNORMAL /LPF
NEUTROPHILS # BLD AUTO: 3.85 X10*3/UL (ref 1.2–7.7)
NEUTROPHILS NFR BLD AUTO: 62.5 %
NITRITE UR QL STRIP.AUTO: NEGATIVE
NON HDL CHOLESTEROL: 71 MG/DL (ref 0–149)
NRBC BLD-RTO: 0 /100 WBCS (ref 0–0)
PH UR STRIP.AUTO: 6.5 [PH]
PLATELET # BLD AUTO: 343 X10*3/UL (ref 150–450)
POTASSIUM SERPL-SCNC: 4.6 MMOL/L (ref 3.5–5.3)
PROT SERPL-MCNC: 6.4 G/DL (ref 6.4–8.2)
PROT UR STRIP.AUTO-MCNC: NEGATIVE MG/DL
RBC # BLD AUTO: 4.29 X10*6/UL (ref 4–5.2)
RBC # UR STRIP.AUTO: ABNORMAL /UL
RBC #/AREA URNS AUTO: ABNORMAL /HPF
SODIUM SERPL-SCNC: 139 MMOL/L (ref 136–145)
SP GR UR STRIP.AUTO: 1.02
SQUAMOUS #/AREA URNS AUTO: ABNORMAL /HPF
TRIGL SERPL-MCNC: 93 MG/DL (ref 0–149)
TSH SERPL-ACNC: 1.78 MIU/L (ref 0.44–3.98)
UROBILINOGEN UR STRIP.AUTO-MCNC: NORMAL MG/DL
VLDL: 19 MG/DL (ref 0–40)
WBC # BLD AUTO: 6.2 X10*3/UL (ref 4.4–11.3)
WBC #/AREA URNS AUTO: ABNORMAL /HPF

## 2024-05-17 PROCEDURE — 83036 HEMOGLOBIN GLYCOSYLATED A1C: CPT

## 2024-05-17 PROCEDURE — 36415 COLL VENOUS BLD VENIPUNCTURE: CPT

## 2024-05-17 PROCEDURE — 85025 COMPLETE CBC W/AUTO DIFF WBC: CPT

## 2024-05-17 PROCEDURE — 80053 COMPREHEN METABOLIC PANEL: CPT

## 2024-05-17 PROCEDURE — 81001 URINALYSIS AUTO W/SCOPE: CPT

## 2024-05-17 PROCEDURE — 84443 ASSAY THYROID STIM HORMONE: CPT

## 2024-05-17 PROCEDURE — 80061 LIPID PANEL: CPT

## 2024-05-24 ENCOUNTER — OFFICE VISIT (OUTPATIENT)
Dept: PRIMARY CARE | Facility: CLINIC | Age: 54
End: 2024-05-24
Payer: COMMERCIAL

## 2024-05-24 VITALS
OXYGEN SATURATION: 99 % | HEIGHT: 64 IN | BODY MASS INDEX: 29.71 KG/M2 | WEIGHT: 174 LBS | RESPIRATION RATE: 21 BRPM | HEART RATE: 84 BPM

## 2024-05-24 DIAGNOSIS — R73.01 IFG (IMPAIRED FASTING GLUCOSE): ICD-10-CM

## 2024-05-24 DIAGNOSIS — Z00.00 ROUTINE GENERAL MEDICAL EXAMINATION AT A HEALTH CARE FACILITY: Primary | ICD-10-CM

## 2024-05-24 DIAGNOSIS — R20.2 PARESTHESIA: ICD-10-CM

## 2024-05-24 PROCEDURE — 99396 PREV VISIT EST AGE 40-64: CPT | Performed by: INTERNAL MEDICINE

## 2024-05-24 PROCEDURE — 3048F LDL-C <100 MG/DL: CPT | Performed by: INTERNAL MEDICINE

## 2024-05-24 PROCEDURE — 3044F HG A1C LEVEL LT 7.0%: CPT | Performed by: INTERNAL MEDICINE

## 2024-05-24 PROCEDURE — 1036F TOBACCO NON-USER: CPT | Performed by: INTERNAL MEDICINE

## 2024-05-24 RX ORDER — METFORMIN HYDROCHLORIDE 500 MG/1
500 TABLET, EXTENDED RELEASE ORAL
Qty: 90 TABLET | Refills: 3 | Status: SHIPPED | OUTPATIENT
Start: 2024-05-24 | End: 2024-05-29 | Stop reason: SDUPTHER

## 2024-05-24 ASSESSMENT — PROMIS GLOBAL HEALTH SCALE
RATE_PHYSICAL_HEALTH: GOOD
RATE_SOCIAL_SATISFACTION: VERY GOOD
CARRYOUT_PHYSICAL_ACTIVITIES: MOSTLY
RATE_AVERAGE_PAIN: 3
RATE_GENERAL_HEALTH: GOOD
RATE_AVERAGE_FATIGUE: MILD
RATE_MENTAL_HEALTH: GOOD
RATE_QUALITY_OF_LIFE: VERY GOOD
EMOTIONAL_PROBLEMS: OFTEN
CARRYOUT_SOCIAL_ACTIVITIES: VERY GOOD

## 2024-05-24 ASSESSMENT — ENCOUNTER SYMPTOMS
OCCASIONAL FEELINGS OF UNSTEADINESS: 0
DEPRESSION: 0
LOSS OF SENSATION IN FEET: 0

## 2024-05-24 ASSESSMENT — PATIENT HEALTH QUESTIONNAIRE - PHQ9
1. LITTLE INTEREST OR PLEASURE IN DOING THINGS: NOT AT ALL
2. FEELING DOWN, DEPRESSED OR HOPELESS: NOT AT ALL
SUM OF ALL RESPONSES TO PHQ9 QUESTIONS 1 AND 2: 0

## 2024-05-24 NOTE — PROGRESS NOTES
"Subjective   Patient ID: Madie Jones is a 53 y.o. female who presents for CPE.    Prediabetes    Psoriasis    Alopecia             HPI     Review of Systems    Rash    Alopecia    No Fever/chills/headaches/dizziness/chest pains/ cough/ shortness of breath/palpitations/ abdominal pain /Nausea/vomiting/diarrhea/ constipation/urine frequency/blood in urine.    Lower back pain    Objective   Pulse 84   Resp 21   Ht 1.626 m (5' 4\")   Wt 78.9 kg (174 lb)   SpO2 99%   BMI 29.87 kg/m²     Physical Exam    No JVP elevation. No palpable Lymph Nodes. No Thyromegaly    HEENT- Negative    CVS-NL S1/S2 . No MRG    Lungs-CTA. B/S= B/L    Abdomen-Soft, Non-tender. No masses or HSM    Extremities: No C/C/E    Skin-No abnormal moles/    Rash-Psoriasis of elbows        Assessment/Plan        Prediabetes-UCC4K=4.3- Increase Metformin 500 mg -3 Tabs daily    Psoriasis/Alopecia -Follow up with dermatology    Hypothyroid-     Continue with current Rx    Follow up in 12 months /PRN           "

## 2024-05-28 DIAGNOSIS — R73.01 IFG (IMPAIRED FASTING GLUCOSE): ICD-10-CM

## 2024-05-28 RX ORDER — METFORMIN HYDROCHLORIDE 500 MG/1
500 TABLET, EXTENDED RELEASE ORAL 3 TIMES DAILY
Qty: 90 TABLET | Refills: 0 | OUTPATIENT
Start: 2024-05-28 | End: 2024-06-27

## 2024-05-28 NOTE — TELEPHONE ENCOUNTER
Pt stated prescription stated take 1 time daily, she stated its supposed to be taken 3 times daily. Also insurance only covers 30 days at a time.

## 2024-05-29 ENCOUNTER — TELEPHONE (OUTPATIENT)
Dept: PRIMARY CARE | Facility: CLINIC | Age: 54
End: 2024-05-29
Payer: COMMERCIAL

## 2024-05-29 DIAGNOSIS — R73.01 IFG (IMPAIRED FASTING GLUCOSE): ICD-10-CM

## 2024-05-29 RX ORDER — METFORMIN HYDROCHLORIDE 500 MG/1
500 TABLET, EXTENDED RELEASE ORAL 3 TIMES DAILY
Qty: 180 TABLET | Refills: 3 | Status: SHIPPED | OUTPATIENT
Start: 2024-05-29 | End: 2025-01-24

## 2024-05-29 NOTE — TELEPHONE ENCOUNTER
PT STATED METFORMIN WAS SENT OVER INCORRECTLY PATIENT IS TAKING TWO TABLETS IN THE MORNING AND ONE AT NIGHT PLEASE SEND NEW SCRIPT TO PHARMACY WITH CORRECT DIRECTION

## 2024-06-04 NOTE — PROGRESS NOTES
Madie Jones is a 53 y.o. female who is here for a routine exam. PCP = Harsha Robins MD    HPI: Patient presenting for annual exam. She has been on Loestrin for 2-3 yrs. She has been having spotting rarely, last one happened in March ( before that in January) In December and January she was having postcoital spotting, she hasn't had  this in the last 6 mo.    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  Seatbelt: always  Exercise: elliptical, walking and swimming  Sexual, physical, mental abuse: safe      Never smoked, alcohol social, no drugs     Past med hx and past surg hx reviewed and notable for: prediabetes on Metformin, Hypothyroidism on Synthroid 75 mcg, Psoriasis on Clobetasol          Social History     Socioeconomic History    Marital status:      Spouse name: Not on file    Number of children: Not on file    Years of education: Not on file    Highest education level: Not on file   Occupational History    Not on file   Tobacco Use    Smoking status: Never    Smokeless tobacco: Never   Vaping Use    Vaping status: Never Used   Substance and Sexual Activity    Alcohol use: Yes     Alcohol/week: 1.0 standard drink of alcohol     Types: 1 Shots of liquor per week     Comment: once a week    Drug use: Never    Sexual activity: Not on file   Other Topics Concern    Not on file   Social History Narrative    Not on file     Social Determinants of Health     Financial Resource Strain: Not on file   Food Insecurity: Not on file   Transportation Needs: Not on file   Physical Activity: Not on file   Stress: Not on file   Social Connections: Not on file   Intimate Partner Violence: Not on file   Housing Stability: Not on file       Objective   BP  "124/70 (BP Location: Right arm)   Ht 1.626 m (5' 4\")   Wt 80.5 kg (177 lb 6.4 oz)   BMI 30.45 kg/m²      General:   Alert and oriented, in no acute distress   Neck: Supple. No visible thyromegaly.    Breast/Axilla: Normal to palpation bilaterally without masses, skin changes, or nipple discharge.    Abdomen: Soft, non-tender, without masses or organomegaly   Vulva: Normal architecture without erythema, masses, or lesions.    Vagina: Normal mucosa without lesions, masses, or atrophy. No abnormal vaginal discharge.    Cervix: Small polyp noted, taken out using a cervical forceps, sent to pathology for evaluation Patient tolerated the procedure well.,   Uterus: Normal mobile, non-enlarged uterus    Adnexa: Normal without masses or lesions   Pelvic Floor No POP noted. No high tone pelvic floor    Psych Normal affect. Normal mood.        Annual  OCP- will transition to HT. Script sent  Mammogram   Pap and HPV  in 2027  She is getting a colonoscopy-December/2024  Lo Loestrin switched to Vivelle+ Progesterone  Cervical polypectomy, follow up with results      Orders Placed This Encounter   Procedures    BI mammo bilateral screening tomosynthesis     Standing Status:   Future     Standing Expiration Date:   8/6/2025     Order Specific Question:   Perform a breast ultrasound if clinically indicated by Radiologist?     Answer:   Yes     Order Specific Question:   Is the patient pregnant?     Answer:   No     Order Specific Question:   Previous Mamm performed at  location?     Answer:   Yes     Order Specific Question:   Reason for exam:     Answer:   Screening     Order Specific Question:   Radiologist to Determine Optimal Study     Answer:   Yes     Order Specific Question:   Release result to [facility]     Answer:   Immediate [1]     Order Specific Question:   Is this exam part of a Research Study? If Yes, link this order to the research study     Answer:   No       Problem List Items Addressed This Visit    None  Visit " Diagnoses       Well woman exam with routine gynecological exam    -  Primary    Relevant Orders    BI mammo bilateral screening tomosynthesis    Cervical polyp        Relevant Orders    Surgical Pathology Exam    Menopausal symptoms        Relevant Medications    estradiol (Vivelle-DOT) 0.05 mg/24 hr patch    progesterone (Prometrium) 100 mg capsule            Thank you for coming to your annual exam.     Seen and d/w Dr. Monica Shelby MD PGY1

## 2024-06-06 ENCOUNTER — OFFICE VISIT (OUTPATIENT)
Dept: OBSTETRICS AND GYNECOLOGY | Facility: CLINIC | Age: 54
End: 2024-06-06
Payer: COMMERCIAL

## 2024-06-06 VITALS
BODY MASS INDEX: 30.29 KG/M2 | WEIGHT: 177.4 LBS | HEIGHT: 64 IN | SYSTOLIC BLOOD PRESSURE: 124 MMHG | DIASTOLIC BLOOD PRESSURE: 70 MMHG

## 2024-06-06 DIAGNOSIS — Z01.419 WELL WOMAN EXAM WITH ROUTINE GYNECOLOGICAL EXAM: Primary | ICD-10-CM

## 2024-06-06 DIAGNOSIS — N84.1 CERVICAL POLYP: ICD-10-CM

## 2024-06-06 DIAGNOSIS — N95.1 MENOPAUSAL SYMPTOMS: ICD-10-CM

## 2024-06-06 PROCEDURE — 3048F LDL-C <100 MG/DL: CPT | Performed by: OBSTETRICS & GYNECOLOGY

## 2024-06-06 PROCEDURE — 3074F SYST BP LT 130 MM HG: CPT | Performed by: OBSTETRICS & GYNECOLOGY

## 2024-06-06 PROCEDURE — 3078F DIAST BP <80 MM HG: CPT | Performed by: OBSTETRICS & GYNECOLOGY

## 2024-06-06 PROCEDURE — 88305 TISSUE EXAM BY PATHOLOGIST: CPT

## 2024-06-06 PROCEDURE — 1036F TOBACCO NON-USER: CPT | Performed by: OBSTETRICS & GYNECOLOGY

## 2024-06-06 PROCEDURE — 3044F HG A1C LEVEL LT 7.0%: CPT | Performed by: OBSTETRICS & GYNECOLOGY

## 2024-06-06 PROCEDURE — 99396 PREV VISIT EST AGE 40-64: CPT | Performed by: OBSTETRICS & GYNECOLOGY

## 2024-06-06 RX ORDER — CLOBETASOL PROPIONATE 0.5 MG/G
1 OINTMENT TOPICAL DAILY
COMMUNITY
Start: 2023-07-27

## 2024-06-06 RX ORDER — ESTRADIOL 0.05 MG/D
1 FILM, EXTENDED RELEASE TRANSDERMAL 2 TIMES WEEKLY
Qty: 26 PATCH | Refills: 3 | Status: SHIPPED | OUTPATIENT
Start: 2024-06-06 | End: 2025-06-06

## 2024-06-06 RX ORDER — PROGESTERONE 100 MG/1
100 CAPSULE ORAL NIGHTLY
Qty: 90 CAPSULE | Refills: 3 | Status: SHIPPED | OUTPATIENT
Start: 2024-06-06 | End: 2025-06-06

## 2024-06-06 ASSESSMENT — ENCOUNTER SYMPTOMS
EYES NEGATIVE: 0
ENDOCRINE NEGATIVE: 0
GASTROINTESTINAL NEGATIVE: 0
ALLERGIC/IMMUNOLOGIC NEGATIVE: 0
CONSTITUTIONAL NEGATIVE: 0
HEMATOLOGIC/LYMPHATIC NEGATIVE: 0
NEUROLOGICAL NEGATIVE: 0
RESPIRATORY NEGATIVE: 0
PSYCHIATRIC NEGATIVE: 0
CARDIOVASCULAR NEGATIVE: 0
MUSCULOSKELETAL NEGATIVE: 0

## 2024-06-06 ASSESSMENT — PAIN SCALES - GENERAL: PAINLEVEL: 0-NO PAIN

## 2024-06-14 LAB
LABORATORY COMMENT REPORT: NORMAL
PATH REPORT.FINAL DX SPEC: NORMAL
PATH REPORT.GROSS SPEC: NORMAL
PATH REPORT.RELEVANT HX SPEC: NORMAL
PATH REPORT.TOTAL CANCER: NORMAL

## 2024-09-27 DIAGNOSIS — E78.5 ELEVATED LIPIDS: ICD-10-CM

## 2024-09-27 RX ORDER — LEVOTHYROXINE SODIUM 75 UG/1
75 TABLET ORAL DAILY
Qty: 90 TABLET | Refills: 1 | Status: SHIPPED | OUTPATIENT
Start: 2024-09-27 | End: 2025-09-27

## 2024-10-10 ENCOUNTER — APPOINTMENT (OUTPATIENT)
Dept: PRIMARY CARE | Facility: CLINIC | Age: 54
End: 2024-10-10
Payer: COMMERCIAL

## 2024-10-10 VITALS
HEART RATE: 73 BPM | HEIGHT: 64 IN | OXYGEN SATURATION: 99 % | BODY MASS INDEX: 30.83 KG/M2 | SYSTOLIC BLOOD PRESSURE: 104 MMHG | WEIGHT: 180.6 LBS | TEMPERATURE: 98 F | DIASTOLIC BLOOD PRESSURE: 88 MMHG

## 2024-10-10 DIAGNOSIS — E03.8 OTHER SPECIFIED HYPOTHYROIDISM: ICD-10-CM

## 2024-10-10 DIAGNOSIS — E11.9 TYPE 2 DIABETES MELLITUS WITHOUT COMPLICATION, WITHOUT LONG-TERM CURRENT USE OF INSULIN (MULTI): Primary | ICD-10-CM

## 2024-10-10 DIAGNOSIS — Z23 NEED FOR INFLUENZA VACCINATION: ICD-10-CM

## 2024-10-10 PROBLEM — R11.2 PONV (POSTOPERATIVE NAUSEA AND VOMITING): Status: RESOLVED | Noted: 2023-12-11 | Resolved: 2024-10-10

## 2024-10-10 PROBLEM — J06.9 ACUTE UPPER RESPIRATORY INFECTION: Status: ACTIVE | Noted: 2024-10-10

## 2024-10-10 PROBLEM — Z98.890 PONV (POSTOPERATIVE NAUSEA AND VOMITING): Status: RESOLVED | Noted: 2023-12-11 | Resolved: 2024-10-10

## 2024-10-10 PROBLEM — H93.8X1 SENSATION OF PLUGGED EAR ON RIGHT SIDE: Status: RESOLVED | Noted: 2023-05-08 | Resolved: 2024-10-10

## 2024-10-10 PROBLEM — M79.10 MUSCLE PAIN: Status: RESOLVED | Noted: 2023-05-08 | Resolved: 2024-10-10

## 2024-10-10 PROBLEM — S89.90XA INJURY OF LOWER EXTREMITY: Status: RESOLVED | Noted: 2023-05-08 | Resolved: 2024-10-10

## 2024-10-10 PROBLEM — H44.009 EYE INFECTION: Status: RESOLVED | Noted: 2023-05-08 | Resolved: 2024-10-10

## 2024-10-10 PROBLEM — N39.0 URINARY TRACT INFECTION: Status: ACTIVE | Noted: 2024-10-10

## 2024-10-10 PROBLEM — E78.5 ELEVATED LIPIDS: Status: RESOLVED | Noted: 2023-05-08 | Resolved: 2024-10-10

## 2024-10-10 PROBLEM — H52.13 MYOPIA OF BOTH EYES: Status: ACTIVE | Noted: 2024-03-14

## 2024-10-10 PROBLEM — N39.0 URINARY TRACT INFECTION: Status: RESOLVED | Noted: 2024-10-10 | Resolved: 2024-10-10

## 2024-10-10 PROBLEM — R13.10 DYSPHAGIA: Status: RESOLVED | Noted: 2023-05-08 | Resolved: 2024-10-10

## 2024-10-10 PROBLEM — B34.3 PARVOVIRUS INFECTION: Status: ACTIVE | Noted: 2024-10-10

## 2024-10-10 PROBLEM — N92.4 ABNORMAL PERIMENOPAUSAL BLEEDING: Status: RESOLVED | Noted: 2023-05-08 | Resolved: 2024-10-10

## 2024-10-10 PROBLEM — J06.9 ACUTE UPPER RESPIRATORY INFECTION: Status: RESOLVED | Noted: 2024-10-10 | Resolved: 2024-10-10

## 2024-10-10 PROBLEM — B34.3 PARVOVIRUS INFECTION: Status: RESOLVED | Noted: 2024-10-10 | Resolved: 2024-10-10

## 2024-10-10 PROBLEM — N93.9 ABNORMAL UTERINE BLEEDING: Status: ACTIVE | Noted: 2024-10-10

## 2024-10-10 PROBLEM — L98.9 INFLAMMATORY DERMATOSIS: Status: ACTIVE | Noted: 2023-05-08

## 2024-10-10 PROBLEM — R73.9 HYPERGLYCEMIA: Status: RESOLVED | Noted: 2023-05-08 | Resolved: 2024-10-10

## 2024-10-10 PROCEDURE — 1036F TOBACCO NON-USER: CPT | Performed by: FAMILY MEDICINE

## 2024-10-10 PROCEDURE — 90656 IIV3 VACC NO PRSV 0.5 ML IM: CPT | Performed by: FAMILY MEDICINE

## 2024-10-10 PROCEDURE — 3074F SYST BP LT 130 MM HG: CPT | Performed by: FAMILY MEDICINE

## 2024-10-10 PROCEDURE — 99214 OFFICE O/P EST MOD 30 MIN: CPT | Performed by: FAMILY MEDICINE

## 2024-10-10 PROCEDURE — 90471 IMMUNIZATION ADMIN: CPT | Performed by: FAMILY MEDICINE

## 2024-10-10 PROCEDURE — 3079F DIAST BP 80-89 MM HG: CPT | Performed by: FAMILY MEDICINE

## 2024-10-10 PROCEDURE — 3044F HG A1C LEVEL LT 7.0%: CPT | Performed by: FAMILY MEDICINE

## 2024-10-10 PROCEDURE — 3048F LDL-C <100 MG/DL: CPT | Performed by: FAMILY MEDICINE

## 2024-10-10 PROCEDURE — 3008F BODY MASS INDEX DOCD: CPT | Performed by: FAMILY MEDICINE

## 2024-10-10 RX ORDER — CLOBETASOL PROPIONATE 0.5 MG/ML
SOLUTION TOPICAL
COMMUNITY
Start: 2024-09-18

## 2024-10-10 ASSESSMENT — ENCOUNTER SYMPTOMS
OCCASIONAL FEELINGS OF UNSTEADINESS: 0
DEPRESSION: 0
LOSS OF SENSATION IN FEET: 0

## 2024-10-10 ASSESSMENT — PATIENT HEALTH QUESTIONNAIRE - PHQ9
2. FEELING DOWN, DEPRESSED OR HOPELESS: NOT AT ALL
SUM OF ALL RESPONSES TO PHQ9 QUESTIONS 1 AND 2: 0
1. LITTLE INTEREST OR PLEASURE IN DOING THINGS: NOT AT ALL

## 2024-10-10 NOTE — PROGRESS NOTES
"Subjective   Patient ID: Madie Jones is a 54 y.o. female who presents for Establish Care.    HPI   Patient is a former patient of Dr. Robins  Follows with GYN at  with Dr. Anderson  Used to see endocrine for pre-DMII and hypothyroidism but now monitored by PCP  Does have some dermatologic issues and goes to [facility].  Overall in good health.\  Wellness visit usually in May  Review of Systems  Negative unless noted in HPI    Objective   /88 (BP Location: Left arm, Patient Position: Sitting, BP Cuff Size: Adult)   Pulse 73   Temp 36.7 °C (98 °F) (Oral)   Ht 1.626 m (5' 4\")   Wt 81.9 kg (180 lb 9.6 oz)   SpO2 99%   BMI 31.00 kg/m²     Physical Exam  Constitutional:       Appearance: Normal appearance.   Pulmonary:      Effort: Pulmonary effort is normal.   Neurological:      General: No focal deficit present.      Mental Status: She is alert.   Psychiatric:         Mood and Affect: Mood normal.         Assessment/Plan   Problem List Items Addressed This Visit             ICD-10-CM    Type 2 diabetes mellitus without complication (Multi) - Primary E11.9     Overall treated as prediabetes with only occasional elevated A1Cs  Current regimen is Metformin 1000mg AM and 500mg PM  Continue on sample  Repeat A1C at 6 month paul, order in place         Other specified hypothyroidism E03.8     Labs up to date, continue annual monitoring          Other Visit Diagnoses         Codes    Need for influenza vaccination     Z23    Relevant Orders    Flu vaccine, trivalent, preservative free, age 6 months and greater (Fluraix/Fluzone/Flulaval) (Completed)               "

## 2024-10-10 NOTE — ASSESSMENT & PLAN NOTE
Overall treated as prediabetes with only occasional elevated A1Cs  Current regimen is Metformin 1000mg AM and 500mg PM  Continue on sample  Repeat A1C at 6 month paul, order in place

## 2024-11-11 ENCOUNTER — LAB (OUTPATIENT)
Dept: LAB | Facility: LAB | Age: 54
End: 2024-11-11
Payer: COMMERCIAL

## 2024-11-11 DIAGNOSIS — R30.9 URINATION PAIN: ICD-10-CM

## 2024-11-11 DIAGNOSIS — N39.0 URINARY TRACT INFECTION WITHOUT HEMATURIA, SITE UNSPECIFIED: Primary | ICD-10-CM

## 2024-11-11 DIAGNOSIS — N39.0 URINARY TRACT INFECTION WITHOUT HEMATURIA, SITE UNSPECIFIED: ICD-10-CM

## 2024-11-11 PROCEDURE — 87186 SC STD MICRODIL/AGAR DIL: CPT

## 2024-11-11 PROCEDURE — 87086 URINE CULTURE/COLONY COUNT: CPT

## 2024-11-11 NOTE — PROGRESS NOTES
Pt called in requesting an order be placed for a urine culture to test for possible UTI. Pt stated she's having some discomfort with urination. Order placed and advised she can go to any  lab.

## 2024-11-12 ENCOUNTER — TELEPHONE (OUTPATIENT)
Dept: PRIMARY CARE | Facility: CLINIC | Age: 54
End: 2024-11-12
Payer: COMMERCIAL

## 2024-11-12 DIAGNOSIS — N30.00 ACUTE CYSTITIS WITHOUT HEMATURIA: Primary | ICD-10-CM

## 2024-11-12 RX ORDER — NITROFURANTOIN 25; 75 MG/1; MG/1
100 CAPSULE ORAL 2 TIMES DAILY
Qty: 10 CAPSULE | Refills: 0 | Status: SHIPPED | OUTPATIENT
Start: 2024-11-12 | End: 2024-11-17

## 2024-11-12 NOTE — TELEPHONE ENCOUNTER
Spoke with pt, informed of message below. Verbalized understanding.     ----- Message from Roxanne Andrade sent at 11/12/2024  3:06 PM EST -----  Let pt know that urine culture did show bacteria.  I have sent an antibiotic to her pharmacy.  Will change if necessary based on susceptibility. JL

## 2024-11-13 LAB — BACTERIA UR CULT: ABNORMAL

## 2024-11-21 ENCOUNTER — PATIENT MESSAGE (OUTPATIENT)
Dept: PRIMARY CARE | Facility: CLINIC | Age: 54
End: 2024-11-21
Payer: COMMERCIAL

## 2024-11-22 ENCOUNTER — LAB (OUTPATIENT)
Dept: LAB | Facility: LAB | Age: 54
End: 2024-11-22
Payer: COMMERCIAL

## 2024-11-22 DIAGNOSIS — R35.0 URINARY FREQUENCY: Primary | ICD-10-CM

## 2024-11-22 DIAGNOSIS — R35.0 URINARY FREQUENCY: ICD-10-CM

## 2024-11-22 LAB
APPEARANCE UR: CLEAR
BILIRUB UR STRIP.AUTO-MCNC: NEGATIVE MG/DL
COLOR UR: COLORLESS
GLUCOSE UR STRIP.AUTO-MCNC: NORMAL MG/DL
KETONES UR STRIP.AUTO-MCNC: NEGATIVE MG/DL
LEUKOCYTE ESTERASE UR QL STRIP.AUTO: NEGATIVE
NITRITE UR QL STRIP.AUTO: NEGATIVE
PH UR STRIP.AUTO: 6.5 [PH]
PROT UR STRIP.AUTO-MCNC: NEGATIVE MG/DL
RBC # UR STRIP.AUTO: NEGATIVE /UL
SP GR UR STRIP.AUTO: 1
UROBILINOGEN UR STRIP.AUTO-MCNC: NORMAL MG/DL

## 2024-11-22 PROCEDURE — 87086 URINE CULTURE/COLONY COUNT: CPT

## 2024-11-22 PROCEDURE — 81003 URINALYSIS AUTO W/O SCOPE: CPT

## 2024-11-24 LAB — BACTERIA UR CULT: NORMAL

## 2024-12-02 ENCOUNTER — HOSPITAL ENCOUNTER (OUTPATIENT)
Dept: RADIOLOGY | Facility: CLINIC | Age: 54
Discharge: HOME | End: 2024-12-02
Payer: COMMERCIAL

## 2024-12-02 VITALS — WEIGHT: 175 LBS | BODY MASS INDEX: 29.88 KG/M2 | HEIGHT: 64 IN

## 2024-12-02 DIAGNOSIS — Z01.419 WELL WOMAN EXAM WITH ROUTINE GYNECOLOGICAL EXAM: ICD-10-CM

## 2024-12-02 PROCEDURE — 77067 SCR MAMMO BI INCL CAD: CPT

## 2024-12-02 PROCEDURE — 77067 SCR MAMMO BI INCL CAD: CPT | Performed by: RADIOLOGY

## 2024-12-02 PROCEDURE — 77063 BREAST TOMOSYNTHESIS BI: CPT | Performed by: RADIOLOGY

## 2024-12-16 ENCOUNTER — HOSPITAL ENCOUNTER (OUTPATIENT)
Dept: GASTROENTEROLOGY | Facility: HOSPITAL | Age: 54
Discharge: HOME | End: 2024-12-16
Payer: COMMERCIAL

## 2024-12-16 ENCOUNTER — ANESTHESIA (OUTPATIENT)
Dept: GASTROENTEROLOGY | Facility: HOSPITAL | Age: 54
End: 2024-12-16
Payer: COMMERCIAL

## 2024-12-16 ENCOUNTER — ANESTHESIA EVENT (OUTPATIENT)
Dept: GASTROENTEROLOGY | Facility: HOSPITAL | Age: 54
End: 2024-12-16
Payer: COMMERCIAL

## 2024-12-16 VITALS
HEIGHT: 64 IN | TEMPERATURE: 96.8 F | HEART RATE: 82 BPM | BODY MASS INDEX: 29.85 KG/M2 | SYSTOLIC BLOOD PRESSURE: 105 MMHG | DIASTOLIC BLOOD PRESSURE: 79 MMHG | RESPIRATION RATE: 15 BRPM | WEIGHT: 174.82 LBS | OXYGEN SATURATION: 99 %

## 2024-12-16 DIAGNOSIS — Z86.0100 HISTORY OF COLON POLYPS: ICD-10-CM

## 2024-12-16 LAB — PREGNANCY TEST URINE, POC: NEGATIVE

## 2024-12-16 PROCEDURE — 2500000004 HC RX 250 GENERAL PHARMACY W/ HCPCS (ALT 636 FOR OP/ED): Performed by: NURSE ANESTHETIST, CERTIFIED REGISTERED

## 2024-12-16 PROCEDURE — A45390 PR COLONOSCOPY FLX W/ENDOSCOPIC MUCOSAL RESECTION: Performed by: NURSE ANESTHETIST, CERTIFIED REGISTERED

## 2024-12-16 PROCEDURE — 81025 URINE PREGNANCY TEST: CPT | Performed by: INTERNAL MEDICINE

## 2024-12-16 PROCEDURE — 3700000002 HC GENERAL ANESTHESIA TIME - EACH INCREMENTAL 1 MINUTE

## 2024-12-16 PROCEDURE — 45388 COLONOSCOPY W/ABLATION: CPT | Performed by: INTERNAL MEDICINE

## 2024-12-16 PROCEDURE — 45385 COLONOSCOPY W/LESION REMOVAL: CPT | Performed by: INTERNAL MEDICINE

## 2024-12-16 PROCEDURE — 45390 COLONOSCOPY W/RESECTION: CPT | Performed by: INTERNAL MEDICINE

## 2024-12-16 PROCEDURE — 7100000010 HC PHASE TWO TIME - EACH INCREMENTAL 1 MINUTE

## 2024-12-16 PROCEDURE — 2720000007 HC OR 272 NO HCPCS

## 2024-12-16 PROCEDURE — 3700000001 HC GENERAL ANESTHESIA TIME - INITIAL BASE CHARGE

## 2024-12-16 PROCEDURE — 7100000009 HC PHASE TWO TIME - INITIAL BASE CHARGE

## 2024-12-16 RX ORDER — MIDAZOLAM HYDROCHLORIDE 1 MG/ML
INJECTION INTRAMUSCULAR; INTRAVENOUS AS NEEDED
Status: DISCONTINUED | OUTPATIENT
Start: 2024-12-16 | End: 2024-12-16

## 2024-12-16 RX ORDER — FENTANYL CITRATE 50 UG/ML
INJECTION, SOLUTION INTRAMUSCULAR; INTRAVENOUS AS NEEDED
Status: DISCONTINUED | OUTPATIENT
Start: 2024-12-16 | End: 2024-12-16

## 2024-12-16 RX ORDER — ONDANSETRON HYDROCHLORIDE 2 MG/ML
INJECTION, SOLUTION INTRAVENOUS AS NEEDED
Status: DISCONTINUED | OUTPATIENT
Start: 2024-12-16 | End: 2024-12-16

## 2024-12-16 RX ORDER — PROPOFOL 10 MG/ML
INJECTION, EMULSION INTRAVENOUS AS NEEDED
Status: DISCONTINUED | OUTPATIENT
Start: 2024-12-16 | End: 2024-12-16

## 2024-12-16 RX ORDER — GLYCOPYRROLATE 0.2 MG/ML
INJECTION INTRAMUSCULAR; INTRAVENOUS AS NEEDED
Status: DISCONTINUED | OUTPATIENT
Start: 2024-12-16 | End: 2024-12-16

## 2024-12-16 RX ORDER — LIDOCAINE HYDROCHLORIDE 20 MG/ML
INJECTION, SOLUTION EPIDURAL; INFILTRATION; INTRACAUDAL; PERINEURAL AS NEEDED
Status: DISCONTINUED | OUTPATIENT
Start: 2024-12-16 | End: 2024-12-16

## 2024-12-16 SDOH — HEALTH STABILITY: MENTAL HEALTH: CURRENT SMOKER: 0

## 2024-12-16 ASSESSMENT — PAIN SCALES - GENERAL
PAINLEVEL_OUTOF10: 0 - NO PAIN
PAINLEVEL_OUTOF10: 0 - NO PAIN
PAIN_LEVEL: 0

## 2024-12-16 ASSESSMENT — ENCOUNTER SYMPTOMS: CONSTITUTIONAL NEGATIVE: 1

## 2024-12-16 ASSESSMENT — PAIN - FUNCTIONAL ASSESSMENT: PAIN_FUNCTIONAL_ASSESSMENT: 0-10

## 2024-12-16 NOTE — DISCHARGE INSTRUCTIONS

## 2024-12-16 NOTE — ANESTHESIA PREPROCEDURE EVALUATION
Patient: Madie Jones    Procedure Information       Date/Time: 12/16/24 0810    Scheduled providers: James Sandy DO; Elliott Crum MD; IBRAHIMA Maloney    Procedure: COLONOSCOPY    Location: [facility]            Relevant Problems   Cardiac (within normal limits)      Pulmonary (within normal limits)      Neuro   (+) Anxiety   (+) Lumbosacral neuritis      GI (within normal limits)      /Renal (within normal limits)      Liver (within normal limits)      Endocrine   (+) Other specified hypothyroidism   (+) Type 2 diabetes mellitus without complication (Multi)      Hematology (within normal limits)      Musculoskeletal (within normal limits)      Skin (within normal limits)      GYN   (+) Abnormal uterine bleeding       Clinical information reviewed:   Tobacco  Allergies  Meds   Med Hx  Surg Hx  OB Status  Fam Hx  Soc   Hx        NPO Detail:  No data recorded     Physical Exam    Airway  Mallampati: I  TM distance: >3 FB  Neck ROM: full     Cardiovascular   Rhythm: regular  Rate: normal     Dental - normal exam     Pulmonary - normal exam     Abdominal - normal exam             Anesthesia Plan    History of general anesthesia?: yes  History of complications of general anesthesia?: no    ASA 2     general     The patient is not a current smoker.    intravenous induction   Anesthetic plan and risks discussed with patient.  Use of blood products discussed with patient who consented to blood products.

## 2024-12-16 NOTE — H&P
History Of Present Illness  Madie Jones is a 54 y.o. female presenting with history of large colon polyps here for 1 year surveillance after her first colonoscopy which was triggered by +Cologuard.     Past Medical History  Past Medical History:   Diagnosis Date    Abnormal perimenopausal bleeding 05/08/2023    Abnormal weight gain 11/17/2018    Abnormal weight gain    Acute upper respiratory infection 10/10/2024    Dysphagia 05/08/2023    Elevated lipids 05/08/2023    Eye infection 05/08/2023    History of bilateral breast reduction surgery 2016    Hyperglycemia 05/08/2023    Injury of lower extremity 05/08/2023    Muscle pain 05/08/2023    Parvovirus infection 10/10/2024    Parvovirus infection, unspecified     Parvovirus infection    Personal history of other diseases of the musculoskeletal system and connective tissue 08/02/2018    History of joint pain    Personal history of other diseases of the respiratory system 01/07/2015    History of pharyngitis    Personal history of other diseases of the respiratory system 08/02/2018    History of sore throat    Personal history of other infectious and parasitic diseases 05/04/2016    History of herpes zoster    PONV (postoperative nausea and vomiting)     PONV (postoperative nausea and vomiting) 12/11/2023    Psoriasis     Psoriasis     Sensation of plugged ear on right side 05/08/2023    Urinary tract infection 10/10/2024    Comment on above: Added by Problem List Migration; 2013-6-13; Moved to [facility] Nov 23 2013 9:04PM;       Surgical History  Past Surgical History:   Procedure Laterality Date    BREAST SURGERY  01/27/2016    Breast Surgery Reduction Procedure Bilateral    CHOLECYSTECTOMY  01/27/2016    Cholecystectomy    MOUTH SURGERY  01/27/2016    Oral Surgery Tooth Extraction    OTHER SURGICAL HISTORY  01/27/2016    Repair Of Superficial Wound Upper Extrem Finger(S)     Social History  She reports that she has never smoked. She has never used smokeless  "tobacco. She reports current alcohol use of about 1.0 standard drink of alcohol per week. She reports that she does not use drugs.    Family History  Family History   Problem Relation Name Age of Onset    Hypothyroidism Mother      Other (cardiac arrythmia) Father      Skin cancer Father      Colon cancer Paternal Grandfather          Allergies  Allergies   Allergen Reactions    Liraglutide Diarrhea, Nausea Only, Anxiety, GI Upset and Nausea/vomiting     Review of Systems   Constitutional: Negative.         Physical Exam  Constitutional:       Appearance: Normal appearance. She is normal weight.   Cardiovascular:      Rate and Rhythm: Normal rate and regular rhythm.   Pulmonary:      Effort: Pulmonary effort is normal.      Breath sounds: Normal breath sounds.   Abdominal:      General: Abdomen is flat.      Palpations: Abdomen is soft.   Neurological:      Mental Status: She is alert.   Psychiatric:         Mood and Affect: Mood normal.         Behavior: Behavior normal.         Thought Content: Thought content normal.         Judgment: Judgment normal.          Last Recorded Vitals  Blood pressure (!) 135/97, pulse 102, temperature 36 °C (96.8 °F), temperature source Temporal, resp. rate 16, height 1.626 m (5' 4\"), weight 79.3 kg (174 lb 13.2 oz), SpO2 98%.    Assessment/Plan   Colonoscopy surveillance as planned     James Sandy, DO  "

## 2024-12-16 NOTE — ANESTHESIA POSTPROCEDURE EVALUATION
Patient: Madie Jones    Procedure Summary       Date: 12/16/24 Room / Location: [facility]    Anesthesia Start: 0839 Anesthesia Stop: 0951    Procedure: COLONOSCOPY Diagnosis: History of colon polyps    Scheduled Providers: James Sandy DO; Elliott Crum MD; KIRSTY Maloney-CRNA Responsible Provider: Elliott Crum MD    Anesthesia Type: general ASA Status: 2            Anesthesia Type: general    Vitals Value Taken Time   /79 12/16/24 1015   Temp 36 °C (96.8 °F) 12/16/24 1015   Pulse 78 12/16/24 1016   Resp 15 12/16/24 1015   SpO2 98 % 12/16/24 1016   Vitals shown include unfiled device data.    Anesthesia Post Evaluation    Patient location during evaluation: bedside  Patient participation: complete - patient participated  Level of consciousness: awake and alert  Pain score: 0  Pain management: satisfactory to patient  Airway patency: patent  Cardiovascular status: acceptable  Respiratory status: acceptable  Hydration status: acceptable  Postoperative Nausea and Vomiting: none        No notable events documented.

## 2024-12-17 ASSESSMENT — PAIN SCALES - GENERAL: PAINLEVEL_OUTOF10: 0 - NO PAIN

## 2024-12-25 LAB
LABORATORY COMMENT REPORT: NORMAL
PATH REPORT.FINAL DX SPEC: NORMAL
PATH REPORT.GROSS SPEC: NORMAL
PATH REPORT.TOTAL CANCER: NORMAL
RESIDENT REVIEW: NORMAL

## 2024-12-30 ENCOUNTER — TELEPHONE (OUTPATIENT)
Dept: OBSTETRICS AND GYNECOLOGY | Facility: CLINIC | Age: 54
End: 2024-12-30
Payer: COMMERCIAL

## 2024-12-30 ENCOUNTER — LAB (OUTPATIENT)
Dept: LAB | Facility: LAB | Age: 54
End: 2024-12-30
Payer: COMMERCIAL

## 2024-12-30 DIAGNOSIS — R39.9 URINARY TRACT INFECTION SYMPTOMS: ICD-10-CM

## 2024-12-30 LAB
APPEARANCE UR: CLEAR
BACTERIA #/AREA URNS AUTO: ABNORMAL /HPF
BILIRUB UR STRIP.AUTO-MCNC: NEGATIVE MG/DL
COLOR UR: ABNORMAL
GLUCOSE UR STRIP.AUTO-MCNC: NORMAL MG/DL
HOLD SPECIMEN: NORMAL
KETONES UR STRIP.AUTO-MCNC: NEGATIVE MG/DL
LEUKOCYTE ESTERASE UR QL STRIP.AUTO: ABNORMAL
MUCOUS THREADS #/AREA URNS AUTO: ABNORMAL /LPF
NITRITE UR QL STRIP.AUTO: NEGATIVE
PH UR STRIP.AUTO: 6.5 [PH]
PROT UR STRIP.AUTO-MCNC: NEGATIVE MG/DL
RBC # UR STRIP.AUTO: ABNORMAL /UL
RBC #/AREA URNS AUTO: ABNORMAL /HPF
SP GR UR STRIP.AUTO: 1.01
UROBILINOGEN UR STRIP.AUTO-MCNC: NORMAL MG/DL
WBC #/AREA URNS AUTO: >50 /HPF

## 2024-12-30 PROCEDURE — 87086 URINE CULTURE/COLONY COUNT: CPT

## 2024-12-30 PROCEDURE — 87186 SC STD MICRODIL/AGAR DIL: CPT

## 2024-12-30 PROCEDURE — 81001 URINALYSIS AUTO W/SCOPE: CPT

## 2024-12-31 ENCOUNTER — TELEPHONE (OUTPATIENT)
Dept: OBSTETRICS AND GYNECOLOGY | Facility: CLINIC | Age: 54
End: 2024-12-31
Payer: COMMERCIAL

## 2024-12-31 DIAGNOSIS — N30.00 ACUTE CYSTITIS WITHOUT HEMATURIA: Primary | ICD-10-CM

## 2024-12-31 DIAGNOSIS — R39.9 URINARY TRACT INFECTION SYMPTOMS: ICD-10-CM

## 2024-12-31 RX ORDER — NITROFURANTOIN (MACROCRYSTALS) 100 MG/1
100 CAPSULE ORAL 2 TIMES DAILY
Qty: 10 CAPSULE | Refills: 0 | Status: CANCELLED | OUTPATIENT
Start: 2024-12-31 | End: 2025-01-05

## 2024-12-31 RX ORDER — NITROFURANTOIN 25; 75 MG/1; MG/1
100 CAPSULE ORAL 2 TIMES DAILY
Qty: 14 CAPSULE | Refills: 0 | Status: SHIPPED | OUTPATIENT
Start: 2024-12-31 | End: 2025-01-07

## 2025-01-01 LAB — BACTERIA UR CULT: ABNORMAL

## 2025-01-02 ENCOUNTER — TELEPHONE (OUTPATIENT)
Dept: OBSTETRICS AND GYNECOLOGY | Facility: CLINIC | Age: 55
End: 2025-01-02
Payer: COMMERCIAL

## 2025-01-02 NOTE — TELEPHONE ENCOUNTER
Called patient to discuss positive UTI and questions  Identified by name and   Patient's second UTI since November, previously has not had them before  Scheduled with provider on  for fuv  Recently started on progesterone and estrogen, just not sure   Patches sometimes don't stay on? Wondering if she should try switching to gel instead?   Message forwarded to Dr. Anderson for her to advise when able  Patient verbalized understanding, all questions/concerns addressed at this time.    ARIN Rondon RN    ----- Message from Nino Basilio sent at 2024  9:05 PM EST -----  Dr. Anderson's patient, requesting call to discuss urine culture results. Please contact to solicit questions thank you. Antibiotics sent to her [facility].

## 2025-01-06 ENCOUNTER — APPOINTMENT (OUTPATIENT)
Dept: OBSTETRICS AND GYNECOLOGY | Facility: CLINIC | Age: 55
End: 2025-01-06
Payer: COMMERCIAL

## 2025-01-06 VITALS — WEIGHT: 184 LBS | SYSTOLIC BLOOD PRESSURE: 130 MMHG | BODY MASS INDEX: 31.58 KG/M2 | DIASTOLIC BLOOD PRESSURE: 83 MMHG

## 2025-01-06 DIAGNOSIS — R39.9 URINARY TRACT INFECTION SYMPTOMS: Primary | ICD-10-CM

## 2025-01-06 DIAGNOSIS — N90.89 VULVAR IRRITATION: ICD-10-CM

## 2025-01-06 PROCEDURE — 99214 OFFICE O/P EST MOD 30 MIN: CPT | Performed by: OBSTETRICS & GYNECOLOGY

## 2025-01-06 PROCEDURE — 3079F DIAST BP 80-89 MM HG: CPT | Performed by: OBSTETRICS & GYNECOLOGY

## 2025-01-06 PROCEDURE — 87205 SMEAR GRAM STAIN: CPT

## 2025-01-06 PROCEDURE — 3075F SYST BP GE 130 - 139MM HG: CPT | Performed by: OBSTETRICS & GYNECOLOGY

## 2025-01-06 RX ORDER — CLOTRIMAZOLE AND BETAMETHASONE DIPROPIONATE 10; .64 MG/G; MG/G
1 CREAM TOPICAL 2 TIMES DAILY
Qty: 15 G | Refills: 2 | Status: SHIPPED | OUTPATIENT
Start: 2025-01-06 | End: 2025-01-06 | Stop reason: SDUPTHER

## 2025-01-06 RX ORDER — CLOTRIMAZOLE AND BETAMETHASONE DIPROPIONATE 10; .64 MG/G; MG/G
1 CREAM TOPICAL 2 TIMES DAILY
Qty: 15 G | Refills: 2 | Status: SHIPPED | OUTPATIENT
Start: 2025-01-06 | End: 2025-02-03

## 2025-01-06 NOTE — PROGRESS NOTES
"Madie Jones is a 54 y.o. female who presents with a chief complaint of vaginal discomfort (Vaginal discomfort)  SUBJECTIVE  New patient to this practice.  Review of the chart notes that she had a positive urine culture on December 30, 2024.  She is currently on Macrobid twice daily, fifth day of 7.  She comes because of vulvar area \"irritation\".  She denies discharge or odor.  No lesions, no bleeding.  No fevers or chills, no change in bowel habits.  She recently had a colonoscopy that notes hemorrhoids.  She has been increasing her use of Tucks pads and Preparation H.  She does feel the symptoms of UTI are finally resolving.    Also she was on LoLoestrin until June 2024 and she recently switched to estradiol patch and oral progesterone at bedtime.  Stopping the birth control pill she has been sexually active with her  using condoms that has spermicide.    Reviewed shows Pap was normal in May 2022, HPV negative.    Past Medical History:   Diagnosis Date    Abnormal perimenopausal bleeding 05/08/2023    Abnormal weight gain 11/17/2018    Abnormal weight gain    Acute upper respiratory infection 10/10/2024    Dysphagia 05/08/2023    Elevated lipids 05/08/2023    Eye infection 05/08/2023    History of bilateral breast reduction surgery 2016    Hyperglycemia 05/08/2023    Injury of lower extremity 05/08/2023    Muscle pain 05/08/2023    Parvovirus infection 10/10/2024    Parvovirus infection, unspecified     Parvovirus infection    Personal history of other diseases of the musculoskeletal system and connective tissue 08/02/2018    History of joint pain    Personal history of other diseases of the respiratory system 01/07/2015    History of pharyngitis    Personal history of other diseases of the respiratory system 08/02/2018    History of sore throat    Personal history of other infectious and parasitic diseases 05/04/2016    History of herpes zoster    PONV (postoperative nausea and vomiting)     PONV " (postoperative nausea and vomiting) 2023    Psoriasis     Psoriasis     Sensation of plugged ear on right side 2023    Urinary tract infection 10/10/2024    Comment on above: Added by Problem List Migration; 2013; Moved to [facility] 2013 9:04PM;       Past Surgical History:   Procedure Laterality Date    BREAST SURGERY  2016    Breast Surgery Reduction Procedure Bilateral    CHOLECYSTECTOMY  2016    Cholecystectomy    MOUTH SURGERY  2016    Oral Surgery Tooth Extraction    OTHER SURGICAL HISTORY  2016    Repair Of Superficial Wound Upper Extrem Finger(S)     Social History     Socioeconomic History    Marital status:    Tobacco Use    Smoking status: Never    Smokeless tobacco: Never   Vaping Use    Vaping status: Never Used   Substance and Sexual Activity    Alcohol use: Yes     Alcohol/week: 1.0 standard drink of alcohol     Types: 1 Shots of liquor per week     Comment: once a week    Drug use: Never    Sexual activity: Defer     Family History   Problem Relation Name Age of Onset    Hypothyroidism Mother      Other (cardiac arrythmia) Father      Skin cancer Father      Colon cancer Paternal Grandfather         OB History    Para Term  AB Living   2 2 2         SAB IAB Ectopic Multiple Live Births                  # Outcome Date GA Lbr Keith/2nd Weight Sex Type Anes PTL Lv   2 Term            1 Term                OBJECTIVE  Allergies   Allergen Reactions    Liraglutide Diarrhea, Nausea Only, Anxiety, GI Upset and Nausea/vomiting      (Not in a hospital admission)       Review of Systems  Negative except vulvovaginal irritation without bleeding.  Physical Exam  General Appearance: well developed, well nourished  On exam the external genitalia appear normal.  No lesions or redness.  No perirectal findings, no thrombosis noted.  Hemorrhoidal tags seen.  On speculum exam there is a small amount of scant white discharge that was collected.  No  lesions.  Bimanual exam is nontender, no masses  /83   Wt 83.5 kg (184 lb)   BMI 31.58 kg/m²    Problem List Items Addressed This Visit    None  This is a 54-year-old female that has a vulvovaginal discomfort.  It appears her UTI symptoms are resolving but this is her second UTI since November 2024.  A swab was collected for BV and yeast.    I do recommend using Lotrisone externally for the irritation in the vulvovaginal/perirectal area.    I did order a urine culture to be repeated after finishing the current course of nitrofurantoin.  If there is recurrent UTIs she may need referral to Urology.    We discussed d-mannose and cranberry supplements for recurrent UTI symptoms.  She could be a candidate for estradiol vaginal cream in addition to her patch and progesterone.  She does have follow-up with her primary GYN.

## 2025-01-08 DIAGNOSIS — Z12.11 COLON CANCER SCREENING: ICD-10-CM

## 2025-01-08 LAB
CLUE CELLS VAG LPF-#/AREA: NORMAL /[LPF]
NUGENT SCORE: 0
YEAST VAG WET PREP-#/AREA: NORMAL

## 2025-01-08 RX ORDER — SODIUM, POTASSIUM,MAG SULFATES 17.5-3.13G
SOLUTION, RECONSTITUTED, ORAL ORAL
Qty: 1 EACH | Refills: 0 | Status: SHIPPED | OUTPATIENT
Start: 2025-01-08

## 2025-01-13 ENCOUNTER — LAB (OUTPATIENT)
Dept: LAB | Facility: LAB | Age: 55
End: 2025-01-13
Payer: COMMERCIAL

## 2025-01-13 DIAGNOSIS — R39.9 URINARY TRACT INFECTION SYMPTOMS: ICD-10-CM

## 2025-01-13 PROCEDURE — 87086 URINE CULTURE/COLONY COUNT: CPT

## 2025-01-14 LAB — BACTERIA UR CULT: NORMAL

## 2025-01-15 DIAGNOSIS — R73.01 IFG (IMPAIRED FASTING GLUCOSE): ICD-10-CM

## 2025-01-15 RX ORDER — METFORMIN HYDROCHLORIDE 500 MG/1
500 TABLET, EXTENDED RELEASE ORAL 3 TIMES DAILY
Qty: 180 TABLET | Refills: 3 | Status: SHIPPED | OUTPATIENT
Start: 2025-01-15 | End: 2025-09-12

## 2025-01-16 DIAGNOSIS — Z78.0 MENOPAUSE: Primary | ICD-10-CM

## 2025-01-16 RX ORDER — ESTRADIOL 0.75 MG/1.25G
GEL, METERED TOPICAL
Qty: 37.2 G | Refills: 3 | Status: SHIPPED | OUTPATIENT
Start: 2025-01-16

## 2025-01-17 ENCOUNTER — PATIENT MESSAGE (OUTPATIENT)
Dept: OBSTETRICS AND GYNECOLOGY | Facility: CLINIC | Age: 55
End: 2025-01-17
Payer: COMMERCIAL

## 2025-01-17 DIAGNOSIS — R39.9 URINARY TRACT INFECTION SYMPTOMS: Primary | ICD-10-CM

## 2025-01-17 RX ORDER — DOXYCYCLINE 100 MG/1
100 CAPSULE ORAL 2 TIMES DAILY
Qty: 14 CAPSULE | Refills: 0 | Status: SHIPPED | OUTPATIENT
Start: 2025-01-17 | End: 2025-01-24

## 2025-02-28 ENCOUNTER — OFFICE VISIT (OUTPATIENT)
Dept: OBSTETRICS AND GYNECOLOGY | Facility: CLINIC | Age: 55
End: 2025-02-28
Payer: COMMERCIAL

## 2025-02-28 VITALS
HEART RATE: 79 BPM | DIASTOLIC BLOOD PRESSURE: 90 MMHG | HEIGHT: 64 IN | SYSTOLIC BLOOD PRESSURE: 144 MMHG | BODY MASS INDEX: 31.55 KG/M2 | WEIGHT: 184.8 LBS

## 2025-02-28 DIAGNOSIS — N95.2 VAGINAL ATROPHY: Primary | ICD-10-CM

## 2025-02-28 DIAGNOSIS — N39.0 RECURRENT UTI: ICD-10-CM

## 2025-02-28 DIAGNOSIS — R39.9 URINARY TRACT INFECTION SYMPTOMS: ICD-10-CM

## 2025-02-28 DIAGNOSIS — N81.6 PELVIC ORGAN PROLAPSE QUANTIFICATION STAGE 2 RECTOCELE: ICD-10-CM

## 2025-02-28 DIAGNOSIS — N81.10 POP-Q STAGE 2 CYSTOCELE: ICD-10-CM

## 2025-02-28 LAB
POC APPEARANCE, URINE: CLEAR
POC BILIRUBIN, URINE: NEGATIVE
POC BLOOD, URINE: NEGATIVE
POC COLOR, URINE: YELLOW
POC GLUCOSE, URINE: NEGATIVE MG/DL
POC KETONES, URINE: NEGATIVE MG/DL
POC LEUKOCYTES, URINE: ABNORMAL
POC NITRITE,URINE: NEGATIVE
POC PH, URINE: 6 PH
POC PROTEIN, URINE: NEGATIVE MG/DL
POC SPECIFIC GRAVITY, URINE: 1.02
POC UROBILINOGEN, URINE: 0.2 EU/DL

## 2025-02-28 PROCEDURE — 3077F SYST BP >= 140 MM HG: CPT | Performed by: OBSTETRICS & GYNECOLOGY

## 2025-02-28 PROCEDURE — 3008F BODY MASS INDEX DOCD: CPT | Performed by: OBSTETRICS & GYNECOLOGY

## 2025-02-28 PROCEDURE — 81003 URINALYSIS AUTO W/O SCOPE: CPT | Performed by: OBSTETRICS & GYNECOLOGY

## 2025-02-28 PROCEDURE — 99214 OFFICE O/P EST MOD 30 MIN: CPT | Performed by: OBSTETRICS & GYNECOLOGY

## 2025-02-28 PROCEDURE — 3080F DIAST BP >= 90 MM HG: CPT | Performed by: OBSTETRICS & GYNECOLOGY

## 2025-02-28 PROCEDURE — 1036F TOBACCO NON-USER: CPT | Performed by: OBSTETRICS & GYNECOLOGY

## 2025-02-28 RX ORDER — ESTRADIOL 0.1 MG/G
0.5 CREAM VAGINAL DAILY
Qty: 42.5 G | Refills: 3 | Status: SHIPPED | OUTPATIENT
Start: 2025-02-28 | End: 2026-02-28

## 2025-02-28 ASSESSMENT — ENCOUNTER SYMPTOMS
CARDIOVASCULAR NEGATIVE: 1
NEUROLOGICAL NEGATIVE: 1
ENDOCRINE NEGATIVE: 1
EYES NEGATIVE: 1
RESPIRATORY NEGATIVE: 1
CONSTITUTIONAL NEGATIVE: 1
GASTROINTESTINAL NEGATIVE: 1
PSYCHIATRIC NEGATIVE: 1
MUSCULOSKELETAL NEGATIVE: 1

## 2025-02-28 ASSESSMENT — PAIN SCALES - GENERAL: PAINLEVEL_OUTOF10: 0-NO PAIN

## 2025-02-28 NOTE — PATIENT INSTRUCTIONS
Vaginal estrogen cream: Apply vaginally nightly for 2 weeks, then twice weekly   Please call the office with any questions or concerns.   [phone]

## 2025-02-28 NOTE — PROGRESS NOTES
[facility] Department of Urogynecology   Rosa Anderson MD, MPH   [phone]    ASSESSMENT AND PLAN:   54-year-old female being assessed for recurrent UTIs, vulvar irritation, cystocele, rectocele, and blood pressure monitoring. Comorbidities include: type 2 DM.    Diagnoses:  #1 Recurrent UTIs  #2 Vulvar irritation  #3 Cystocele and rectocele  #4 Blood pressure monitoring    Plan:  Recurrent UTIs  - Discussed that utilizing tv estrogen acts as UTI prophylaxis in postmenopausal women by making the vaginal pH more acidic and providing more blood flow to vaginal tissue/changes the vasculature therefore preventing bacteria from colonizing. We reassured her that using tv estrogen cream has a localized effect to the vaginal tissue and is not a form of HRT such as po estrogen which produces a systemic effect. The risk of using vaginal estrogen cream in correlation with putting patients at greater risk for developing GYN cancer is extremely low as the estrogen acts locally and is not absorbed systemically.   - Sent Rx Estradiol cream to her preferred pharmacy with instructions to apply a pea-sized amount intravaginally/around the urethra 2x/week.   - We encouraged her to call our office and leave a urine sample at any  lab when she feels symptomatic of a UTI in the future so we can result it and treat her accordingly.  - only one uti was e.coli so no need for d-mannose at this time    2. Vulvar irritation  - Vaginal estrogen cream as above may help alleviate symptoms.    3. Cystocele and rectocele  - POP-Q: Aa: -0.5, Ba: -0.5, C: -5.5, gH: 5, pB: 7, Ap: -0.5, Bp: -0.5, D: -7  - No immediate tx required as she is asymptomatic.  - PVR = 0 ml by U/S.    4. Blood pressure monitoring  - Recheck blood pressure during the visit to rule out white coat hypertension.  - Discussed importance of monitoring blood pressure at home if it is elevated upon recheck.    Follow-up with Dr. Anderson in 3 months or PRN  sooner.    Scribe Attestation  By signing my name below, IYinka Tiffanie, Jaqueline, attest that this documentation has been prepared under the direction and in the presence of Rosa Anderson MD MPH on 02/28/2025 at 10:07 AM.    Problem List Items Addressed This Visit    None  Visit Diagnoses       Vaginal atrophy    -  Primary    Relevant Medications    estradiol (Estrace) 0.01 % (0.1 mg/gram) vaginal cream    Urinary tract infection symptoms        Relevant Medications    estradiol (Estrace) 0.01 % (0.1 mg/gram) vaginal cream    Other Relevant Orders    POCT UA Automated manually resulted    Measure post void residual (Completed)    Urinalysis with Reflex Culture and Microscopic           I spent a total of 45 minutes in face to face and non face to face time.     I Dr. Anderson, personally performed the services described in the documentation as scribed in my presence and confirm it is both complete and accurate.  Rosa Anderson MD, MPH, FACOG       Rosa Anderson MD, MPH, FACOG     New    HISTORY OF PRESENT ILLNESS:     Referred by Dr. Madrid    Record Review:   - 1/6/2025 Dr. Madrid note RE: Vulvovaginal discomfort. Appears UTI sxs are resolving but this = second UTI since November 2024. BV + yeast swab collected. Recommend Lotrisone externally for vulvar irritation. Ordered urine culture to be repeated after completing current course of Macrobid. If there are recurrent UTIs, may need referral to Urology. Discussed D-mannose and cranberry supplements for recurrent UTI sxs. Could be a candidate for tv estrogen in addition to her patch/progesterone. She does have FU with primary GYN.  - 12/16/2024 Surgical pathology: Benign colon polyps. Based on type and size of polyps, recommend repeat colonoscopy in 1 year.  - 6/6/2024 Dr. Anderson note RE: Annual exam. She's been on Loestrin for 2-3 years. She has been having rare spotting, last one happened in March (before that in January). In December and January, she  was having postcoital spotting, no signs of this in the last 6 months. Pap and HPV in 2027. Getting colonoscopy in December 2024. Lo Loestrin switched to Vivelle + Progesterone. Cervical polypectomy, FU with results.    Urine Culture History:  - 1/13/2025: No growth.  - 12/30/2024: 20,000-80,000 John pérez.   - 11/22/2024: No growth.  - 11/11/2024: 20,000-80,000 E coli.    Prolapse Symptoms:  - She is worried about prolapse.  - She doesn't feel like there is anything falling out her vagina. Asymptomatic.     Urinary Symptoms:   - She's had 3 UTIs in the last 6 months.   - 2/3 UTIs were cultured, 1 of which was positive.  - The last one, she was out of town so she didn't get a culture.  - She denies kidney stones or pyelonephritis.  - She denies any urinary incontinence or urgency.  - She is considering D-mannose for UTI ppx.  - She is interest in PFPT.    Bowel Symptoms:   - Has regular bowel movements, once in the morning, maybe a second one.  - Bms are soft and easy to pass.  - She doesn't have any accidents with her bowels.    OBGYN History and Sexual Activity:   - Last June, she just switched to the patch and the pill. She was on the Lo Loestrin, and she switched to Vivelle + Progesterone.  - She is trying this month the estrogen gel rather than the patch.  - She's had 2 deliveries, both vaginal.  - She had a big tear with her first one, requiring an episiotomy.  - Her biggest baby was 9 lbs.  - She reports pain after sex, with vaginal burning.  - She has been avoiding intercourse due to fear that it is causing UTIs and vaginal burning/pain/dyspareunia.         Past Medical History:     Past Medical History:   Diagnosis Date    Abnormal perimenopausal bleeding 05/08/2023    Abnormal weight gain 11/17/2018    Abnormal weight gain    Acute upper respiratory infection 10/10/2024    Dysphagia 05/08/2023    Elevated lipids 05/08/2023    Eye infection 05/08/2023    History of bilateral breast reduction surgery  2016    Hyperglycemia 05/08/2023    Injury of lower extremity 05/08/2023    Muscle pain 05/08/2023    Parvovirus infection 10/10/2024    Parvovirus infection, unspecified     Parvovirus infection    Personal history of other diseases of the musculoskeletal system and connective tissue 08/02/2018    History of joint pain    Personal history of other diseases of the respiratory system 01/07/2015    History of pharyngitis    Personal history of other diseases of the respiratory system 08/02/2018    History of sore throat    Personal history of other infectious and parasitic diseases 05/04/2016    History of herpes zoster    PONV (postoperative nausea and vomiting)     PONV (postoperative nausea and vomiting) 12/11/2023    Psoriasis     Psoriasis     Sensation of plugged ear on right side 05/08/2023    Urinary tract infection 10/10/2024    Comment on above: Added by Problem List Migration; 2013-6-13; Moved to [facility] Nov 23 2013 9:04PM;            Past Surgical History:     Past Surgical History:   Procedure Laterality Date    BREAST SURGERY  01/27/2016    Breast Surgery Reduction Procedure Bilateral    CHOLECYSTECTOMY  01/27/2016    Cholecystectomy    MOUTH SURGERY  01/27/2016    Oral Surgery Tooth Extraction    OTHER SURGICAL HISTORY  01/27/2016    Repair Of Superficial Wound Upper Extrem Finger(S)         Medications:     Prior to Admission medications    Medication Sig Start Date End Date Taking? Authorizing Provider   cholecalciferol (Vitamin D-3) 50 mcg (2,000 unit) capsule Take 1 capsule (50 mcg) by mouth once daily. 6/27/22  Yes Historical Provider, MD   clobetasol (Temovate) 0.05 % external solution apply twice daily to scalp for two weeks then use as needed 9/18/24  Yes Historical Provider, MD   cyanocobalamin, vitamin B-12, (VITAMIN B-12 ORAL) Take by mouth.   Yes Historical Provider, MD   estradiol (EstroGel) 0.75 mg/1.25 g gel pump Place on the skin daily. 1 pump depression to the arm or inner thigh  "daily. 1/16/25  Yes Corinne A Bazella, MD   hydrocortisone 2.5 % cream APPLY TO RASH ON FACE 2 TIMES A DAY AS NEEDED FOR 2 WEEKS 12/7/22  Yes Historical Provider, MD   Lactobacillus acidophilus (PROBIOTIC ORAL) Take by mouth. Use as directed   Yes Historical Provider, MD   levothyroxine (Synthroid) 75 mcg tablet Take 1 tablet (75 mcg) by mouth once daily. 9/27/24 9/27/25 Yes Harsha Robins MD   magnesium carb,citrate,oxide (MAGNESIUM COMPLEX ORAL) Take by mouth.   Yes Historical Provider, MD   metFORMIN  mg 24 hr tablet Take 1 tablet (500 mg) by mouth 3 times a day. 1/15/25 9/12/25 Yes Roxanne Andrade MD   progesterone (Prometrium) 100 mg capsule Take 1 capsule (100 mg) by mouth once daily at bedtime. 6/6/24 6/6/25 Yes Corinne A Bazella, MD   sodium,potassium,mag sulfates (Suprep Bowel Prep Kit) 17.5-3.13-1.6 gram solution Take one bottle twice as directed by the prep instructions 1/8/25  Yes James Sandy,    triamcinolone (Nasacort) 55 mcg nasal inhaler Administer 1 spray into affected nostril(s) once daily. 10/8/18  Yes Historical Provider, MD   albuterol 90 mcg/actuation inhaler Inhale.  Patient not taking: Reported on 12/16/2024 9/24/19   Historical Provider, MD   estradiol (Estrace) 0.01 % (0.1 mg/gram) vaginal cream Insert 0.125 Applicatorfuls (0.5 g) into the vagina once daily. Nightly for 2 weeks, then twice weekly. 2/28/25 2/28/26  Rosa Anderson MD MPH        ROS  Review of Systems   Constitutional: Negative.    HENT: Negative.     Eyes: Negative.    Respiratory: Negative.     Cardiovascular: Negative.    Gastrointestinal: Negative.    Endocrine: Negative.    Genitourinary:         RUTIs   Musculoskeletal: Negative.    Neurological: Negative.    Psychiatric/Behavioral: Negative.            PHYSICAL EXAM:    /90 (Patient Position: Sitting)   Pulse 79   Ht 1.626 m (5' 4\")   Wt 83.8 kg (184 lb 12.8 oz)   BMI 31.72 kg/m²   No LMP recorded. (Menstrual status: Menopausal).    Declines chaperone " for physical exam.      Well developed, well nourished, in no apparent distress.   Neurologic/Psychiatric:  Awake, Alert and Oriented times 3.  Affect normal.     GENITAL/URINARY:     External Genitalia:  The patient has normal appearing external genitalia, normal skenes and bartholins glands, and a normal hair distribution.  Her vulva is without lesions, erythema or discharge.  It is non-tender with appropriate sensation.     Urethral Meatus:  Size normal, Location normal, Lesions absent, Prolapse absent.    Urethra:  Fullness absent, Masses absent.    Bladder:  Fullness absent, Masses absent, Tenderness absent.    Vagina:  General appearance normal, Estrogen effect normal, Discharge absent, Lesions absent.     Cervix: Normal, no discharge.   Uterus:  normal size and mobile  Adnexa:   no masses, non-tender    Anus/Perineum:  Lesions absent and Masses absent normal sphincter tone, no lesions  External hemorrhoids noted, otherwise normal appearing external genitalia.    Physical Exam  Genitourinary:      Genitourinary Comments: No pain on external exam.   POP-Q measurements were:      Aa: -0.5, Ba: -0.5, C: -5.5     gH: 5, pB: 7, TVL:     Ap: -0.5, Bp: -0.5, D: -7  Vitals reviewed.         She does not have myofascial tenderness on exam.         Data and DIAGNOSTIC STUDIES REVIEWED   No results found.   Lab Results   Component Value Date    URINECULTURE  01/13/2025     Clinically insignificant growth based on current clinical standards.      Lab Results   Component Value Date    GLUCOSE 114 (H) 05/17/2024    CALCIUM 8.9 05/17/2024     05/17/2024    K 4.6 05/17/2024    CO2 26 05/17/2024     05/17/2024    BUN 17 05/17/2024    CREATININE 1.05 05/17/2024     Lab Results   Component Value Date    WBC 6.2 05/17/2024    HGB 12.9 05/17/2024    HCT 39.9 05/17/2024    MCV 93 05/17/2024     05/17/2024

## 2025-03-03 ENCOUNTER — TELEPHONE (OUTPATIENT)
Dept: OBSTETRICS AND GYNECOLOGY | Facility: CLINIC | Age: 55
End: 2025-03-03
Payer: COMMERCIAL

## 2025-03-03 NOTE — TELEPHONE ENCOUNTER
Message forwarded to Dr. Anderson for review. Patient reported confused regarding if she should stop the gel patch because she is taking the estradiol cream.

## 2025-03-03 NOTE — TELEPHONE ENCOUNTER
Information forwarded to patient for review. Patient agreed to [employer] message ot voicemail message,

## 2025-03-07 DIAGNOSIS — R39.9 URINARY TRACT INFECTION SYMPTOMS: ICD-10-CM

## 2025-03-14 DIAGNOSIS — R39.9 URINARY TRACT INFECTION SYMPTOMS: ICD-10-CM

## 2025-03-21 DIAGNOSIS — R39.9 URINARY TRACT INFECTION SYMPTOMS: ICD-10-CM

## 2025-03-25 DIAGNOSIS — E78.5 ELEVATED LIPIDS: ICD-10-CM

## 2025-03-25 RX ORDER — LEVOTHYROXINE SODIUM 75 UG/1
75 TABLET ORAL DAILY
Qty: 90 TABLET | Refills: 0 | Status: SHIPPED | OUTPATIENT
Start: 2025-03-25

## 2025-03-28 DIAGNOSIS — R39.9 URINARY TRACT INFECTION SYMPTOMS: ICD-10-CM

## 2025-04-04 DIAGNOSIS — R39.9 URINARY TRACT INFECTION SYMPTOMS: ICD-10-CM

## 2025-04-11 DIAGNOSIS — R39.9 URINARY TRACT INFECTION SYMPTOMS: ICD-10-CM

## 2025-04-18 DIAGNOSIS — R39.9 URINARY TRACT INFECTION SYMPTOMS: ICD-10-CM

## 2025-04-25 DIAGNOSIS — R39.9 URINARY TRACT INFECTION SYMPTOMS: ICD-10-CM

## 2025-05-02 DIAGNOSIS — R39.9 URINARY TRACT INFECTION SYMPTOMS: ICD-10-CM

## 2025-05-09 DIAGNOSIS — R39.9 URINARY TRACT INFECTION SYMPTOMS: ICD-10-CM

## 2025-05-16 DIAGNOSIS — R39.9 URINARY TRACT INFECTION SYMPTOMS: ICD-10-CM

## 2025-05-23 DIAGNOSIS — R39.9 URINARY TRACT INFECTION SYMPTOMS: ICD-10-CM

## 2025-05-28 ENCOUNTER — APPOINTMENT (OUTPATIENT)
Dept: PRIMARY CARE | Facility: CLINIC | Age: 55
End: 2025-05-28
Payer: COMMERCIAL

## 2025-05-30 ENCOUNTER — OFFICE VISIT (OUTPATIENT)
Dept: OBSTETRICS AND GYNECOLOGY | Facility: CLINIC | Age: 55
End: 2025-05-30
Payer: COMMERCIAL

## 2025-05-30 VITALS
BODY MASS INDEX: 30.09 KG/M2 | HEART RATE: 72 BPM | SYSTOLIC BLOOD PRESSURE: 124 MMHG | WEIGHT: 180.6 LBS | DIASTOLIC BLOOD PRESSURE: 86 MMHG | HEIGHT: 65 IN

## 2025-05-30 DIAGNOSIS — N95.2 VAGINAL ATROPHY: ICD-10-CM

## 2025-05-30 DIAGNOSIS — N81.10 POP-Q STAGE 2 CYSTOCELE: ICD-10-CM

## 2025-05-30 DIAGNOSIS — N90.89 VULVAR IRRITATION: ICD-10-CM

## 2025-05-30 DIAGNOSIS — N39.0 RECURRENT UTI: Primary | ICD-10-CM

## 2025-05-30 DIAGNOSIS — N81.6 PELVIC ORGAN PROLAPSE QUANTIFICATION STAGE 2 RECTOCELE: ICD-10-CM

## 2025-05-30 DIAGNOSIS — R39.9 URINARY TRACT INFECTION SYMPTOMS: ICD-10-CM

## 2025-05-30 LAB
POC APPEARANCE, URINE: CLEAR
POC BILIRUBIN, URINE: NEGATIVE
POC BLOOD, URINE: NEGATIVE
POC COLOR, URINE: YELLOW
POC GLUCOSE, URINE: NEGATIVE MG/DL
POC KETONES, URINE: NEGATIVE MG/DL
POC LEUKOCYTES, URINE: NEGATIVE
POC NITRITE,URINE: NEGATIVE
POC PH, URINE: 6.5 PH
POC PROTEIN, URINE: NEGATIVE MG/DL
POC SPECIFIC GRAVITY, URINE: 1.01
POC UROBILINOGEN, URINE: 0.2 EU/DL

## 2025-05-30 PROCEDURE — 3074F SYST BP LT 130 MM HG: CPT | Performed by: OBSTETRICS & GYNECOLOGY

## 2025-05-30 PROCEDURE — 99214 OFFICE O/P EST MOD 30 MIN: CPT | Performed by: OBSTETRICS & GYNECOLOGY

## 2025-05-30 PROCEDURE — 3079F DIAST BP 80-89 MM HG: CPT | Performed by: OBSTETRICS & GYNECOLOGY

## 2025-05-30 PROCEDURE — 81003 URINALYSIS AUTO W/O SCOPE: CPT | Performed by: OBSTETRICS & GYNECOLOGY

## 2025-05-30 PROCEDURE — 3008F BODY MASS INDEX DOCD: CPT | Performed by: OBSTETRICS & GYNECOLOGY

## 2025-05-30 ASSESSMENT — ENCOUNTER SYMPTOMS
ENDOCRINE NEGATIVE: 1
GASTROINTESTINAL NEGATIVE: 1
NEUROLOGICAL NEGATIVE: 1
RESPIRATORY NEGATIVE: 1
PSYCHIATRIC NEGATIVE: 1
EYES NEGATIVE: 1
MUSCULOSKELETAL NEGATIVE: 1
CARDIOVASCULAR NEGATIVE: 1
CONSTITUTIONAL NEGATIVE: 1

## 2025-05-30 ASSESSMENT — PAIN SCALES - GENERAL: PAINLEVEL_OUTOF10: 0-NO PAIN

## 2025-05-30 NOTE — PROGRESS NOTES
[facility] Department of Urogynecology   Rosa Anderson MD, MPH   [phone]    ASSESSMENT AND PLAN:   54 year old female with an asymptomatic stage 2 cystocele and rectocele, Olivia and vaginal atrophy. Comorbidities include: Type 2 diabetes mellitus.     Diagnoses:  #1 Recurrent UTI  #2 Vaginal atrophy   #3 Cystocele, stage 2  #4 Rectocele, stage 2  #5 Vulvar irritation     Plan:  1. Olivia, vaginal atrophy   - POCT UA negative.   - No recent UTI within the past x4 months.  - Continue using transvaginal Estrace cream 2x/week around the vaginal opening for UTI ppx with adequate suppression.   - The patient was reassured that D-mannose is not a sugar that is absorbed by the body and should not have any negative impact on T2DM management.    > Plan to hold off on starting D-mannose at Olivia is well-suppressed with E2 cream only at this time.     2. Cystocele and rectocele, stage 2  - Her POP is asymptomatic.   - Discussed POP management options for prolapse including expectant management, PFPT, or a pessary fitting. However, we typically only recommend POP management if she is bothered by her symptoms.   - The patient was counseled that since her PVR is normal she may consider expectant management if she is not particularly bothered by her POP. We specifically provided reassurance that prolapse is a quality of life issue, is not cancerous, and that POP intervention is only necessary when she is bothered by this or if it is causing retention issues. The data shows that prolapse remains the same in stage around 70% of the time which means there is a 30% chance that POP progresses in stage/degree of bother.   - We discussed that PFPT would help strengthen the PF muscles with an overall goal to help improve the symptoms of the vaginal bulge and prevent POP from worsening over time.    > Declines interest in starting PFPT and will continue POP observation.     3. Vulvar irritation   - Her vulvar irritation has  improved with using E2 cream.  - Normal vulvar exam today.     Follow up in 1 year with Dr. Rosa Anderson. She can return to clinic sooner with any new or worsening problems.     Scribe Attestation  By signing my name below, I, Jordan Karrie, Sumae, attest that this documentation has been prepared under the direction and in the presence of Rosa Anderson MD MPH on 05/30/2025 at 7:53 PM.     Problem List Items Addressed This Visit    None  Visit Diagnoses         Recurrent UTI        Relevant Orders    Measure post void residual (Completed)    POCT UA Automated manually resulted (Completed)           I spent a total of *** minutes in face to face and non face to face time.      Rosa Anderson MD, MPH, FACOG     Established    HISTORY OF PRESENT ILLNESS:   54 year old female presenting in follow up for recurrent UTI.      Record Review:   - 2/28/2025 Dr. Anderson note re: Olivia - Start E2 cream. Vulvar irritation - Begin E2 cream. Cystocele and rectocele - Asymptomatic. Continue observation.   - 1/6/2025 Dr. Madrid note RE: Vulvovaginal discomfort. Appears UTI sxs are resolving but this = second UTI since November 2024. BV + yeast swab collected. Recommend Lotrisone externally for vulvar irritation. Ordered urine culture to be repeated after completing current course of Macrobid. If there are recurrent UTIs, may need referral to Urology. Discussed D-mannose and cranberry supplements for recurrent UTI sxs. Could be a candidate for tv estrogen in addition to her patch/progesterone. She does have FU with primary GYN.  - 12/16/2024 Surgical pathology: Benign colon polyps. Based on type and size of polyps, recommend repeat colonoscopy in 1 year.  - 6/6/2024 Dr. Anderson note RE: Annual exam. She's been on Loestrin for 2-3 years. She has been having rare spotting, last one happened in March (before that in January). In December and January, she was having postcoital spotting, no signs of this in the last 6 months. Pap  and HPV in 2027. Getting colonoscopy in December 2024. Lo Loestrin switched to Vivelle + Progesterone. Cervical polypectomy, FU with results.     Urine Culture History:  - 1/13/2025: No growth.  - 12/30/2024: 20,000-80,000 CFU/ML Klmargea caitlinata.   - 11/22/2024: No growth.  - 11/11/2024: 20,000-80,000 CFU/ML Escherichia coli.      Urinary Symptoms:   - No recent UTI within the past x4 months.  - Patient continues to use transvaginal Estrace cream 2x/week for UTI ppx with adequate suppression.   - Denies feeling symptomatic of a UTI today.     Vulvar Symptoms:  - Her vulvar irritation and itching has improved with using E2 cream.   - She reports having a left side ingrown hair over her vulva around x2 weeks ago.     Prolapse Symptoms:  - She is not symptomatic of a vaginal bulge and is not bothered by her stage 2 cystocele/rectocele.       Fingernail Symptoms:  - The patient reports a red and squishy fingernail possibly due to a hangnail that possibly became infected and symptoms have been present for about x9 days.  - She plans to consult with a primary care physician for potential need for antibiotics.      Past Medical History:     Medical History[1]       Past Surgical History:     Surgical History[2]      Medications:     Prior to Admission medications    Medication Sig Start Date End Date Taking? Authorizing Provider   cholecalciferol (Vitamin D-3) 50 mcg (2,000 unit) capsule Take 1 capsule (50 mcg) by mouth once daily. 6/27/22  Yes Historical Provider, MD   clobetasol (Temovate) 0.05 % external solution apply twice daily to scalp for two weeks then use as needed 9/18/24  Yes Historical Provider, MD   cyanocobalamin, vitamin B-12, (VITAMIN B-12 ORAL) Take by mouth.   Yes Historical Provider, MD   estradiol (Estrace) 0.01 % (0.1 mg/gram) vaginal cream Insert 0.125 Applicatorfuls (0.5 g) into the vagina once daily. Nightly for 2 weeks, then twice weekly. 2/28/25 2/28/26 Yes Rosa Anderson MD MPH   estradiol  "(EstroGel) 0.75 mg/1.25 g gel pump Place on the skin daily. 1 pump depression to the arm or inner thigh daily. 1/16/25  Yes Corinne A Bazella, MD   hydrocortisone 2.5 % cream APPLY TO RASH ON FACE 2 TIMES A DAY AS NEEDED FOR 2 WEEKS 12/7/22  Yes Historical Provider, MD   Lactobacillus acidophilus (PROBIOTIC ORAL) Take by mouth. Use as directed   Yes Historical Provider, MD   levothyroxine (Synthroid, Levoxyl) 75 mcg tablet TAKE ONE TABLET BY MOUTH EVERY DAY 3/25/25  Yes Harsha Robins MD   magnesium carb,citrate,oxide (MAGNESIUM COMPLEX ORAL) Take by mouth.   Yes Historical Provider, MD   metFORMIN  mg 24 hr tablet Take 1 tablet (500 mg) by mouth 3 times a day. 1/15/25 9/12/25 Yes Roxanne Andrade MD   progesterone (Prometrium) 100 mg capsule Take 1 capsule (100 mg) by mouth once daily at bedtime. 6/6/24 6/6/25 Yes Corinne A Bazella, MD   sodium,potassium,mag sulfates (Suprep Bowel Prep Kit) 17.5-3.13-1.6 gram solution Take one bottle twice as directed by the prep instructions 1/8/25  Yes James Sandy,    triamcinolone (Nasacort) 55 mcg nasal inhaler Administer 1 spray into affected nostril(s) once daily. 10/8/18  Yes Historical Provider, MD   albuterol 90 mcg/actuation inhaler Inhale.  Patient not taking: Reported on 5/30/2025 9/24/19   Historical Provider, MD BELL  Review of Systems   Constitutional: Negative.    HENT: Negative.     Eyes: Negative.    Respiratory: Negative.     Cardiovascular: Negative.    Gastrointestinal: Negative.    Endocrine: Negative.    Genitourinary: Negative.    Musculoskeletal: Negative.    Neurological: Negative.    Psychiatric/Behavioral: Negative.            PHYSICAL EXAM:    /86 (BP Location: Left arm, Patient Position: Sitting)   Pulse 72   Ht 1.645 m (5' 4.75\")   Wt 81.9 kg (180 lb 9.6 oz)   BMI 30.29 kg/m²   No LMP recorded. (Menstrual status: Menopausal).    Declines chaperone for physical exam.      Well developed, well nourished, in no apparent distress. "   Neurologic/Psychiatric:  Awake, Alert and Oriented times 3.  Affect normal.     GENITAL/URINARY:     External Genitalia:  The patient has normal appearing external genitalia, normal skenes and bartholins glands, and a normal hair distribution.  Her vulva is without lesions, erythema or discharge.  It is non-tender with appropriate sensation.         Data and DIAGNOSTIC STUDIES REVIEWED   Imaging  No results found.    Cardiology, Vascular, and Other Imaging  No other imaging results found for the past 7 days     Lab Results   Component Value Date    URINECULTURE  01/13/2025     Clinically insignificant growth based on current clinical standards.      Lab Results   Component Value Date    GLUCOSE 114 (H) 05/17/2024    CALCIUM 8.9 05/17/2024     05/17/2024    K 4.6 05/17/2024    CO2 26 05/17/2024     05/17/2024    BUN 17 05/17/2024    CREATININE 1.05 05/17/2024     Lab Results   Component Value Date    WBC 6.2 05/17/2024    HGB 12.9 05/17/2024    HCT 39.9 05/17/2024    MCV 93 05/17/2024     05/17/2024          [1]   Past Medical History:  Diagnosis Date    Abnormal perimenopausal bleeding 05/08/2023    Abnormal weight gain 11/17/2018    Abnormal weight gain    Acute upper respiratory infection 10/10/2024    Dysphagia 05/08/2023    Elevated lipids 05/08/2023    Eye infection 05/08/2023    History of bilateral breast reduction surgery 2016    Hyperglycemia 05/08/2023    Injury of lower extremity 05/08/2023    Muscle pain 05/08/2023    Parvovirus infection 10/10/2024    Parvovirus infection, unspecified     Parvovirus infection    Personal history of other diseases of the musculoskeletal system and connective tissue 08/02/2018    History of joint pain    Personal history of other diseases of the respiratory system 01/07/2015    History of pharyngitis    Personal history of other diseases of the respiratory system 08/02/2018    History of sore throat    Personal history of other infectious and  parasitic diseases 05/04/2016    History of herpes zoster    PONV (postoperative nausea and vomiting)     PONV (postoperative nausea and vomiting) 12/11/2023    Psoriasis     Psoriasis     Sensation of plugged ear on right side 05/08/2023    Urinary tract infection 10/10/2024    Comment on above: Added by Problem List Migration; 2013-6-13; Moved to Suppressed Nov 23 2013 9:04PM;     [2]   Past Surgical History:  Procedure Laterality Date    BREAST SURGERY  01/27/2016    Breast Surgery Reduction Procedure Bilateral    CHOLECYSTECTOMY  01/27/2016    Cholecystectomy    MOUTH SURGERY  01/27/2016    Oral Surgery Tooth Extraction    OTHER SURGICAL HISTORY  01/27/2016    Repair Of Superficial Wound Upper Extrem Finger(S)

## 2025-06-02 NOTE — PATIENT INSTRUCTIONS
SOME SUGGESTED VULVAR PAIN & ITCHING MEASURES     The vulva is the external genitalia in the female. The skin of the vulva can be quite sensitive. Because it is moist and frequently subjected to friction while sitting and moving, this area can be easily injured. There are various strategies that can be used to prevent irritation and allow the vulva to heal. Keeping this area dry can accelerate healing. Chemicals found in toilet tissues, laundry soaps and detergents that come in contact with the vulva can cause irritation.   Avoiding contact with potential irritants that contain chemicals is important. Fabric softeners in undergarments, chemicals in deodorant soaps, bubble baths, feminine hygiene spray and panty liners etc., can all cause irritation to the vulva. The following recommendations are specific measures that can help minimize vulvar irritation.   Wear white 100% cotton underwear and do not wear underwear at night. Do not wear pantyhose, tights, or other close-fitting clothes. Enclosing this area with synthetic fibers holds both heat and moisture in the skin, conditions which potentiate the development of infections.   Tight-fitting clothes may also increase your symptoms of discomfort.   After washing underwear, put it through at least one whole cycle with water only. Some women have suffered needlessly from irritants in detergents whose residue was left in clothes by incomplete rinsing. Rinsing clothes thoroughly is more important than which detergent is used although to be on the safe side, the milder the soap, the better. Wash new underwear before wearing. Fabric softeners and dryer sheets should not be used.   Rinse skin off with plain water frequently. Use tap water, distilled water, sitz baths, squirt bottles, or bidets. Additionally, hand held showers can be helpful for rinsing the vulva. After rinsing, pat the skin gently dry.   If the anus is irritated, consider cutting white flannel squares and  placing the square in warm water. Wipe the anus with the wet flannel and discard or wash the flannel.   Use very mild soap for bathing. It is best not to use any soaps on the vulva. The vulva should be rinsed with warm water. Unscented soaps or soaps for sensitive skin are best to use on the body. Special soap products can be found at pharmacies or health food stores. Remember that frequent baths with soaps may increase the irritation.   A compress of oiled Aveeno (a powdered oatmeal bath treatment) has been recommended by some. It is placed over the vulva three to four times a day. Put two tablespoons of Aveeno in one quart of water. Mix in a jar and refrigerate. This is often helpful after intercourse or when symptoms of burning and itching are present.   Some patients find the use of cool gel packs on the vulva to be helpful.   Do not use products with benzocaine.   Consider using 100% cotton menstrual pads and tampons. Many women with vulvar pain experience a significant increase in irritation and pain every month when they use commercial paper pads or tampons. This monthly increase in pain can often be reduced by using 100% washable and reusable cotton menstrual pads. Pure cotton tampons are available.   Don’t sit or remain in a wet bathing suit for prolonged periods.   Additionally, it is often recommended that the vulva is left uncovered at night (i.e. no underwear) to allow adequate exposure to the air.       Adapted From:   The Vulvar Pain Foundation, “Natural and Prophylactic Measures Suggested”, Vulvar Pain   Newsletter 1993: Sprin-6   The Interstitial Cystit[employer] Association Vulvar Pain handout      Please call the office with any questions or concerns.   [phone]

## 2025-06-06 DIAGNOSIS — N95.1 MENOPAUSAL SYMPTOMS: ICD-10-CM

## 2025-06-06 DIAGNOSIS — R39.9 URINARY TRACT INFECTION SYMPTOMS: ICD-10-CM

## 2025-06-09 DIAGNOSIS — Z01.818 PREOP TESTING: Primary | ICD-10-CM

## 2025-06-09 RX ORDER — PROGESTERONE 100 MG/1
100 CAPSULE ORAL NIGHTLY
Qty: 90 CAPSULE | Refills: 0 | Status: SHIPPED | OUTPATIENT
Start: 2025-06-09

## 2025-06-12 ENCOUNTER — APPOINTMENT (OUTPATIENT)
Dept: OBSTETRICS AND GYNECOLOGY | Facility: CLINIC | Age: 55
End: 2025-06-12
Payer: COMMERCIAL

## 2025-06-13 DIAGNOSIS — R39.9 URINARY TRACT INFECTION SYMPTOMS: ICD-10-CM

## 2025-06-16 DIAGNOSIS — E78.5 ELEVATED LIPIDS: ICD-10-CM

## 2025-06-16 RX ORDER — LEVOTHYROXINE SODIUM 75 UG/1
75 TABLET ORAL DAILY
Qty: 90 TABLET | Refills: 0 | Status: SHIPPED | OUTPATIENT
Start: 2025-06-16

## 2025-06-20 DIAGNOSIS — R39.9 URINARY TRACT INFECTION SYMPTOMS: ICD-10-CM

## 2025-06-26 ENCOUNTER — OFFICE VISIT (OUTPATIENT)
Dept: OBSTETRICS AND GYNECOLOGY | Facility: CLINIC | Age: 55
End: 2025-06-26
Payer: COMMERCIAL

## 2025-06-26 ENCOUNTER — APPOINTMENT (OUTPATIENT)
Dept: OBSTETRICS AND GYNECOLOGY | Facility: CLINIC | Age: 55
End: 2025-06-26
Payer: COMMERCIAL

## 2025-06-26 VITALS
DIASTOLIC BLOOD PRESSURE: 74 MMHG | WEIGHT: 182 LBS | HEIGHT: 64 IN | SYSTOLIC BLOOD PRESSURE: 124 MMHG | BODY MASS INDEX: 31.07 KG/M2

## 2025-06-26 DIAGNOSIS — N95.1 MENOPAUSAL SYMPTOMS: ICD-10-CM

## 2025-06-26 DIAGNOSIS — Z12.31 BREAST CANCER SCREENING BY MAMMOGRAM: ICD-10-CM

## 2025-06-26 DIAGNOSIS — Z78.0 MENOPAUSE: ICD-10-CM

## 2025-06-26 DIAGNOSIS — Z12.39 ENCOUNTER FOR BREAST CANCER SCREENING USING NON-MAMMOGRAM MODALITY: ICD-10-CM

## 2025-06-26 DIAGNOSIS — N95.0 PMB (POSTMENOPAUSAL BLEEDING): ICD-10-CM

## 2025-06-26 DIAGNOSIS — Z01.419 WELL WOMAN EXAM WITH ROUTINE GYNECOLOGICAL EXAM: Primary | ICD-10-CM

## 2025-06-26 PROCEDURE — 1036F TOBACCO NON-USER: CPT | Performed by: OBSTETRICS & GYNECOLOGY

## 2025-06-26 PROCEDURE — 99396 PREV VISIT EST AGE 40-64: CPT | Performed by: OBSTETRICS & GYNECOLOGY

## 2025-06-26 PROCEDURE — 3074F SYST BP LT 130 MM HG: CPT | Performed by: OBSTETRICS & GYNECOLOGY

## 2025-06-26 PROCEDURE — 3078F DIAST BP <80 MM HG: CPT | Performed by: OBSTETRICS & GYNECOLOGY

## 2025-06-26 PROCEDURE — 3008F BODY MASS INDEX DOCD: CPT | Performed by: OBSTETRICS & GYNECOLOGY

## 2025-06-26 RX ORDER — PROGESTERONE 100 MG/1
100 CAPSULE ORAL NIGHTLY
Qty: 90 CAPSULE | Refills: 4 | Status: SHIPPED | OUTPATIENT
Start: 2025-06-26

## 2025-06-26 RX ORDER — ESTRADIOL 0.75 MG/1.25G
GEL, METERED TOPICAL
Qty: 37.2 G | Refills: 3 | Status: SHIPPED | OUTPATIENT
Start: 2025-06-26

## 2025-06-26 ASSESSMENT — ENCOUNTER SYMPTOMS
HEMATOLOGIC/LYMPHATIC NEGATIVE: 0
CONSTITUTIONAL NEGATIVE: 0
ENDOCRINE NEGATIVE: 0
CARDIOVASCULAR NEGATIVE: 0
RESPIRATORY NEGATIVE: 0
PSYCHIATRIC NEGATIVE: 0
EYES NEGATIVE: 0
ALLERGIC/IMMUNOLOGIC NEGATIVE: 0
NEUROLOGICAL NEGATIVE: 0
GASTROINTESTINAL NEGATIVE: 0
MUSCULOSKELETAL NEGATIVE: 0

## 2025-06-26 ASSESSMENT — PATIENT HEALTH QUESTIONNAIRE - PHQ9
1. LITTLE INTEREST OR PLEASURE IN DOING THINGS: NOT AT ALL
SUM OF ALL RESPONSES TO PHQ9 QUESTIONS 1 & 2: 0
2. FEELING DOWN, DEPRESSED OR HOPELESS: NOT AT ALL

## 2025-06-26 ASSESSMENT — PAIN SCALES - GENERAL: PAINLEVEL_OUTOF10: 0-NO PAIN

## 2025-06-26 NOTE — PROGRESS NOTES
"Madie Jones is a 55 y.o. female who is here for a routine exam. PCP = Roxanne Andrade MD    History of Present Illness  Madie Jones is a 55 year old female who presents with postmenopausal spotting and concerns about breast cancer screening.    She experienced postmenopausal spotting two days ago, with dark red blood noticed only when wiping. The spotting was a singular occurrence and has not recurred. She is on estradiol vaginal gel twice a week and takes progestin nightly. She needs a refill for the estradiol gel.    She is concerned about breast cancer due to a personal connection with someone recently diagnosed. She has dense breasts and is considering a fast MRI for screening. Her regular mammogram is scheduled for November.       , SVDX2  Gyn hx: Pap and HPV all normal, last in  NILM, HPV neg  Menstrual hx: menarche at 12, On Lo Loestrin, irregular spotting  Sexual concerns: none, comfortable   STI: none  Social hx: personal projects consulting, , two kids are 26 and 23, son at [employer] and [employer].  Seatbelt: always  Exercise: elliptical, walking and swimming  Sexual, physical, mental abuse: safe      Never smoked, alcohol social, no drugs     Past med hx and past surg hx reviewed and notable    Objective   /74 (BP Location: Right arm)   Ht 1.626 m (5' 4\")   Wt 82.6 kg (182 lb)   BMI 31.24 kg/m²      General:   Alert and oriented, in no acute distress   Neck: Supple. No visible thyromegaly.    Breast/Axilla: Normal to palpation bilaterally without masses, skin changes, or nipple discharge.    Abdomen: Soft, non-tender, without masses or organomegaly   Vulva: Normal architecture without erythema, masses, or lesions.    Vagina: Normal mucosa without lesions, masses, or atrophy. No abnormal vaginal discharge.    Cervix: Small polyp noted, taken out using a cervical forceps, sent to pathology for evaluation Patient tolerated the procedure well.,   Uterus: Normal mobile, non-enlarged uterus  "   Adnexa: Normal without masses or lesions   Pelvic Floor No POP noted. No high tone pelvic floor    Psych Normal affect. Normal mood.        Annual  Fast MRI- mammo in fall  Mammogram   Pap and HPV  in 2027  She is getting a colonoscopy-December/2024- scheduled Dec 2025  Assessment & Plan  Menopausal symptoms  Experiencing menopausal symptoms, using estradiol vaginal gel and nightly progestin. Had a single episode of dark red spotting, likely from hormone therapy.   - Refill estradiol vaginal gel prescription.  - Order pelvic ultrasound to assess uterine lining.  - Continue nightly progestin.    Breast cancer screening  Has scattered breast density and concerns about breast cancer. Opted for fast MRI screening, delaying mammogram to maintain six-month interval.  - Order fast breast MRI.  - Reschedule mammogram to December.    General Health Maintenance  Up to date with Pap smear, yearly colonoscopy due to previous polyps, uses D-mannose for UTI prevention, and red light therapy for sleep and mood.  - Continue yearly colonoscopy schedule.  - Continue occasional D-mannose for UTI prevention.  - Continue red light therapy for sleep and mood improvement.       Orders Placed This Encounter   Procedures    US PELVIS TRANSABDOMINAL WITH TRANSVAGINAL     Standing Status:   Future     Expected Date:   6/26/2025     Expiration Date:   6/26/2026     Reason for exam::   Spotting postmenopause on hormone therapy     Radiologist to Determine Optimal Study:   Yes     Release result to [employer]:   Immediate [1]     Is this exam part of a Research Study? If Yes, link this order to the research study:   No    MR breast bilateral w IV contrast fast screening self pay     Standing Status:   Future     Expected Date:   6/26/2025     Expiration Date:   6/26/2026     Reason for exam::   patient request for breast cancer screening     May utilize  for port access if port present for this exam.:   Yes     Does the patient have a  Cochlear Implant, Brain Aneurysm Clip, Implanted Nerve or Bone Graft Stimulator, Implanted Breast Tissue Expander, Glucose Monitor or Neulasta Device?:   No     Does the patient have a Pacemaker, Defibrillator, or cardiac leads abandoned or otherwise? (Please specify if the components are part of a temporary/permanent system).:   No     Radiologist to Determine Optimal Study:   Yes     Release result to [employer]:   Immediate [1]     Is this exam part of a Research Study? If Yes, link this order to the research study:   No       Problem List Items Addressed This Visit    None  Visit Diagnoses         Well woman exam with routine gynecological exam    -  Primary      Breast cancer screening by mammogram          Menopause        Relevant Medications    estradiol (EstroGel) 0.75 mg/1.25 g gel pump      Menopausal symptoms        Relevant Medications    progesterone (Prometrium) 100 mg capsule      Encounter for breast cancer screening using non-mammogram modality        Relevant Orders    MR breast bilateral w IV contrast fast screening self pay      PMB (postmenopausal bleeding)        Relevant Orders    US PELVIS TRANSABDOMINAL WITH TRANSVAGINAL            Thank you for coming to your annual exam.

## 2025-06-27 ENCOUNTER — HOSPITAL ENCOUNTER (OUTPATIENT)
Dept: RADIOLOGY | Facility: HOSPITAL | Age: 55
Discharge: HOME | End: 2025-06-27
Payer: COMMERCIAL

## 2025-06-27 DIAGNOSIS — R39.9 URINARY TRACT INFECTION SYMPTOMS: ICD-10-CM

## 2025-06-27 DIAGNOSIS — N95.0 PMB (POSTMENOPAUSAL BLEEDING): ICD-10-CM

## 2025-06-27 PROCEDURE — 76856 US EXAM PELVIC COMPLETE: CPT

## 2025-06-30 ENCOUNTER — TELEPHONE (OUTPATIENT)
Dept: OBSTETRICS AND GYNECOLOGY | Facility: CLINIC | Age: 55
End: 2025-06-30
Payer: COMMERCIAL

## 2025-07-02 ENCOUNTER — HOSPITAL ENCOUNTER (OUTPATIENT)
Dept: RADIOLOGY | Facility: HOSPITAL | Age: 55
Discharge: HOME | End: 2025-07-02

## 2025-07-02 DIAGNOSIS — Z12.39 ENCOUNTER FOR BREAST CANCER SCREENING USING NON-MAMMOGRAM MODALITY: ICD-10-CM

## 2025-07-02 PROCEDURE — A9575 INJ GADOTERATE MEGLUMI 0.1ML: HCPCS | Performed by: OBSTETRICS & GYNECOLOGY

## 2025-07-02 PROCEDURE — 6100000003 BI MR BREAST BILATERAL WITH CONTRAST FAST SCREENING SELF PAY

## 2025-07-02 PROCEDURE — 2550000001 HC RX 255 CONTRASTS: Performed by: OBSTETRICS & GYNECOLOGY

## 2025-07-02 RX ORDER — GADOTERATE MEGLUMINE 376.9 MG/ML
17 INJECTION INTRAVENOUS
Status: COMPLETED | OUTPATIENT
Start: 2025-07-02 | End: 2025-07-02

## 2025-07-02 RX ADMIN — GADOTERATE MEGLUMINE 17 ML: 376.9 INJECTION INTRAVENOUS at 10:58

## 2025-07-04 DIAGNOSIS — R39.9 URINARY TRACT INFECTION SYMPTOMS: ICD-10-CM

## 2025-07-07 ENCOUNTER — TELEPHONE (OUTPATIENT)
Dept: PRIMARY CARE | Facility: CLINIC | Age: 55
End: 2025-07-07
Payer: COMMERCIAL

## 2025-07-07 NOTE — TELEPHONE ENCOUNTER
Pt LVM wanting to know if she needs to get blood work done before her upcoming annual exam. I called back, LVM advising that Dr. Andrade will typically order labs at [facility] and have blood drawn in the office.

## 2025-07-09 ENCOUNTER — APPOINTMENT (OUTPATIENT)
Dept: PRIMARY CARE | Facility: CLINIC | Age: 55
End: 2025-07-09
Payer: COMMERCIAL

## 2025-07-09 VITALS
SYSTOLIC BLOOD PRESSURE: 122 MMHG | WEIGHT: 180.6 LBS | HEIGHT: 64 IN | BODY MASS INDEX: 30.83 KG/M2 | HEART RATE: 82 BPM | TEMPERATURE: 97.2 F | DIASTOLIC BLOOD PRESSURE: 86 MMHG | OXYGEN SATURATION: 99 %

## 2025-07-09 DIAGNOSIS — M54.17 LUMBOSACRAL NEURITIS: ICD-10-CM

## 2025-07-09 DIAGNOSIS — Z00.00 HEALTH MAINTENANCE EXAMINATION: Primary | ICD-10-CM

## 2025-07-09 DIAGNOSIS — R20.2 PARESTHESIA: ICD-10-CM

## 2025-07-09 DIAGNOSIS — E11.9 TYPE 2 DIABETES MELLITUS WITHOUT COMPLICATION, WITHOUT LONG-TERM CURRENT USE OF INSULIN: ICD-10-CM

## 2025-07-09 DIAGNOSIS — E03.8 OTHER SPECIFIED HYPOTHYROIDISM: ICD-10-CM

## 2025-07-09 LAB — POC HEMOGLOBIN A1C: 6.2 % (ref 4.2–6.5)

## 2025-07-09 PROCEDURE — 83036 HEMOGLOBIN GLYCOSYLATED A1C: CPT | Performed by: FAMILY MEDICINE

## 2025-07-09 PROCEDURE — 99213 OFFICE O/P EST LOW 20 MIN: CPT | Performed by: FAMILY MEDICINE

## 2025-07-09 PROCEDURE — 3079F DIAST BP 80-89 MM HG: CPT | Performed by: FAMILY MEDICINE

## 2025-07-09 PROCEDURE — 3008F BODY MASS INDEX DOCD: CPT | Performed by: FAMILY MEDICINE

## 2025-07-09 PROCEDURE — 1036F TOBACCO NON-USER: CPT | Performed by: FAMILY MEDICINE

## 2025-07-09 PROCEDURE — 3074F SYST BP LT 130 MM HG: CPT | Performed by: FAMILY MEDICINE

## 2025-07-09 PROCEDURE — 3044F HG A1C LEVEL LT 7.0%: CPT | Performed by: FAMILY MEDICINE

## 2025-07-09 PROCEDURE — 99396 PREV VISIT EST AGE 40-64: CPT | Performed by: FAMILY MEDICINE

## 2025-07-09 RX ORDER — METHYLPREDNISOLONE 4 MG/1
TABLET ORAL
Qty: 21 TABLET | Refills: 0 | Status: SHIPPED | OUTPATIENT
Start: 2025-07-09 | End: 2025-07-15

## 2025-07-09 ASSESSMENT — ENCOUNTER SYMPTOMS
LOSS OF SENSATION IN FEET: 0
OCCASIONAL FEELINGS OF UNSTEADINESS: 0
DEPRESSION: 0

## 2025-07-09 NOTE — PROGRESS NOTES
"Reason for Visit: Annual Physical Exam    HPI:  Presents for wellness visit  Had stressful 2024 but ok now  Worries about sugar levels, taking Metformin without any problems  Saw Dr. Anderson for recurrent UTI and doing ok  Up to date with GYN visit, Dr. Anderson    Active Problem List  Problem List[1]    Comprehensive Medical/Surgical/Social/Family History  Medical History[2]  Surgical History[3]  Social History[4]  Family History[5]      Allergies and Medications  Patient has no known allergies.  Medications Ordered Prior to Encounter[6]      ROS otherwise negative aside from what was mentioned above in HPI.    Vitals  /86 (BP Location: Right arm, Patient Position: Sitting, BP Cuff Size: Adult)   Pulse 82   Temp 36.2 °C (97.2 °F) (Temporal)   Ht 1.626 m (5' 4\")   Wt 81.9 kg (180 lb 9.6 oz)   SpO2 99%   BMI 31.00 kg/m²   Body mass index is 31 kg/m².  Physical Exam  Gen: Alert, NAD  HEENT:  PERRL, EOMI, conjunctiva and sclera normal in appearance. External auditory canals/TMs normal; Oral cavity and posterior pharynx without lesions/exudate  Neck:  Supple with FROM; No masses/nodes palpable; Thyroid nontender and without nodules; No STEPH  Respiratory:  Lungs CTAB  Cardiovascular:  Heart RRR. No M/R/G. Peripheral pulses equal bilaterally  Abdomen:  Soft, nontender, No R/G/R; No HSM or masses palpated  Extremities:  FROM all extremities; Muscle strength grossly normal with good tone  Neuro:  CN II-XII intact;  Gross motor and sensory intact  Skin:  No suspicious lesions present    Assessment and Plan:  Problem List Items Addressed This Visit       Type 2 diabetes mellitus without complication    Current Assessment & Plan   Well controlled  Continue Metformin  Check labs  Check in 6 months  Recommend optho  Monitor feet         Relevant Orders    POCT glycosylated hemoglobin (Hb A1C) manually resulted (Completed)    Albumin-Creatinine Ratio, Urine Random    Vitamin B12    Other specified hypothyroidism    " Relevant Orders    TSH with reflex to Free T4 if abnormal    Lumbosacral neuritis    Relevant Medications    methylPREDNISolone (Medrol Dospak) 4 mg tablets    Health maintenance examination - Primary    Current Assessment & Plan   Recommended screening guidelines addressed and orders placed as indicated by age and chronic conditions  Screening labs ordered, will call with results  Continue to work on healthy lifestyle including well balanced diet, regular activity, limit alcohol, no tobacco products   Follow up annually           Relevant Orders    Comprehensive Metabolic Panel    CBC    Lipid Panel    TSH with reflex to Free T4 if abnormal     Other Visit Diagnoses         Paresthesia        Relevant Orders    Vitamin B12              Roxanne Andrade MD         [1]   Patient Active Problem List  Diagnosis    Anxiety    Type 2 diabetes mellitus without complication    Other specified hypothyroidism    IFG (impaired fasting glucose)    Arthralgia of knee    Inflammatory dermatosis    Lumbosacral neuritis    Abnormal uterine bleeding    Myopia of both eyes    Health maintenance examination   [2]   Past Medical History:  Diagnosis Date    Abnormal weight gain 11/17/2018    Abnormal weight gain    Allergic 06/01/1975    Anxiety 06/01/1996    Disease of thyroid gland 06/01/2020    Dysphagia 05/08/2023    Elevated lipids 05/08/2023    Eye infection 05/08/2023    History of bilateral breast reduction surgery 2016    Hyperglycemia 05/08/2023    Parvovirus infection 10/10/2024    Personal history of other diseases of the musculoskeletal system and connective tissue 08/02/2018    History of joint pain    Personal history of other diseases of the respiratory system 01/07/2015    History of pharyngitis    Personal history of other diseases of the respiratory system 08/02/2018    History of sore throat    Personal history of other infectious and parasitic diseases 05/04/2016    History of herpes zoster    PONV (postoperative nausea  and vomiting)     PONV (postoperative nausea and vomiting) 12/11/2023    Psoriasis     Sensation of plugged ear on right side 05/08/2023   [3]   Past Surgical History:  Procedure Laterality Date    BREAST SURGERY  01/27/2016    Breast Surgery Reduction Procedure Bilateral    CHOLECYSTECTOMY  01/27/2016    Cholecystectomy    MOUTH SURGERY  01/27/2016    Oral Surgery Tooth Extraction    OTHER SURGICAL HISTORY  01/27/2016    Repair Of Superficial Wound Upper Extrem Finger(S)   [4]   Social History  Tobacco Use    Smoking status: Never    Smokeless tobacco: Never   Vaping Use    Vaping status: Never Used   Substance Use Topics    Alcohol use: Yes     Alcohol/week: 1.0 standard drink of alcohol     Types: 1 Shots of liquor per week     Comment: once a week    Drug use: Never   [5]   Family History  Problem Relation Name Age of Onset    Hypothyroidism Mother      Other (cardiac arrythmia) Father      Skin cancer Father      Colon cancer Paternal Grandfather Nestor Murry    [6]   Current Outpatient Medications on File Prior to Visit   Medication Sig Dispense Refill    cholecalciferol (Vitamin D-3) 50 mcg (2,000 unit) capsule Take 1 capsule (50 mcg) by mouth once daily.      clobetasol (Temovate) 0.05 % external solution if needed.      CRANBERRY EXTRACT-D-MANNOSE ORAL Take by mouth.      cyanocobalamin, vitamin B-12, (VITAMIN B-12 ORAL) Take by mouth if needed.      estradiol (Estrace) 0.01 % (0.1 mg/gram) vaginal cream Insert 0.125 Applicatorfuls (0.5 g) into the vagina once daily. Nightly for 2 weeks, then twice weekly. 42.5 g 3    estradiol (EstroGel) 0.75 mg/1.25 g gel pump Place on the skin daily. 1 pump depression to the arm or inner thigh daily. 37.2 g 3    hydrocortisone 2.5 % cream if needed.      Lactobacillus acidophilus (PROBIOTIC ORAL) Take by mouth. Use as directed      levothyroxine (Synthroid, Levoxyl) 75 mcg tablet Take 1 tablet (75 mcg) by mouth once daily. 90 tablet 0    magnesium carb,citrate,oxide  (MAGNESIUM COMPLEX ORAL) Take by mouth.      metFORMIN  mg 24 hr tablet Take 1 tablet (500 mg) by mouth 3 times a day. 180 tablet 3    progesterone (Prometrium) 100 mg capsule Take 1 capsule (100 mg) by mouth once daily at bedtime. 90 capsule 4    triamcinolone (Nasacort) 55 mcg nasal inhaler Administer 1 spray into affected nostril(s) once daily.      sodium,potassium,mag sulfates (Suprep Bowel Prep Kit) 17.5-3.13-1.6 gram solution Take one bottle twice as directed by the prep instructions (Patient not taking: Reported on 7/9/2025) 1 each 0     No current facility-administered medications on file prior to visit.

## 2025-07-10 LAB
ALBUMIN/CREAT UR: 3 MG/G CREAT
CREAT UR-MCNC: 65 MG/DL (ref 20–275)
MICROALBUMIN UR-MCNC: 0.2 MG/DL

## 2025-07-11 ENCOUNTER — TELEPHONE (OUTPATIENT)
Dept: PRIMARY CARE | Facility: CLINIC | Age: 55
End: 2025-07-11
Payer: COMMERCIAL

## 2025-07-11 DIAGNOSIS — R39.9 URINARY TRACT INFECTION SYMPTOMS: ICD-10-CM

## 2025-07-11 LAB
ALBUMIN SERPL-MCNC: 4.4 G/DL (ref 3.6–5.1)
ALP SERPL-CCNC: 49 U/L (ref 37–153)
ALT SERPL-CCNC: 18 U/L (ref 6–29)
ANION GAP SERPL CALCULATED.4IONS-SCNC: 8 MMOL/L (CALC) (ref 7–17)
AST SERPL-CCNC: 17 U/L (ref 10–35)
BILIRUB SERPL-MCNC: 0.6 MG/DL (ref 0.2–1.2)
BUN SERPL-MCNC: 15 MG/DL (ref 7–25)
CALCIUM SERPL-MCNC: 9.3 MG/DL (ref 8.6–10.4)
CHLORIDE SERPL-SCNC: 102 MMOL/L (ref 98–110)
CHOLEST SERPL-MCNC: 161 MG/DL
CHOLEST/HDLC SERPL: 2.3 (CALC)
CO2 SERPL-SCNC: 27 MMOL/L (ref 20–32)
CREAT SERPL-MCNC: 0.84 MG/DL (ref 0.5–1.03)
EGFRCR SERPLBLD CKD-EPI 2021: 82 ML/MIN/1.73M2
ERYTHROCYTE [DISTWIDTH] IN BLOOD BY AUTOMATED COUNT: 13.7 % (ref 11–15)
GLUCOSE SERPL-MCNC: 105 MG/DL (ref 65–99)
HCT VFR BLD AUTO: 39.3 % (ref 35–45)
HDLC SERPL-MCNC: 69 MG/DL
HGB BLD-MCNC: 12.9 G/DL (ref 11.7–15.5)
LDLC SERPL CALC-MCNC: 72 MG/DL (CALC)
MCH RBC QN AUTO: 29.9 PG (ref 27–33)
MCHC RBC AUTO-ENTMCNC: 32.8 G/DL (ref 32–36)
MCV RBC AUTO: 91 FL (ref 80–100)
NONHDLC SERPL-MCNC: 92 MG/DL (CALC)
PLATELET # BLD AUTO: 332 THOUSAND/UL (ref 140–400)
PMV BLD REES-ECKER: 9.8 FL (ref 7.5–12.5)
POTASSIUM SERPL-SCNC: 4.8 MMOL/L (ref 3.5–5.3)
PROT SERPL-MCNC: 6.7 G/DL (ref 6.1–8.1)
RBC # BLD AUTO: 4.32 MILLION/UL (ref 3.8–5.1)
SODIUM SERPL-SCNC: 137 MMOL/L (ref 135–146)
TRIGL SERPL-MCNC: 115 MG/DL
TSH SERPL-ACNC: 1.09 MIU/L
VIT B12 SERPL-MCNC: 485 PG/ML (ref 200–1100)
WBC # BLD AUTO: 6.6 THOUSAND/UL (ref 3.8–10.8)

## 2025-07-11 NOTE — TELEPHONE ENCOUNTER
Added through [facility]    ----- Message from Roxanne Andrade sent at 7/11/2025 10:21 AM EDT -----  Regarding: add on  Are you able to add on a rubeola titer IgG to her labs.  Thanks, RASHAD  dx immunity status testing

## 2025-07-16 LAB
ALBUMIN SERPL-MCNC: 4.4 G/DL (ref 3.6–5.1)
ALP SERPL-CCNC: 49 U/L (ref 37–153)
ALT SERPL-CCNC: 18 U/L (ref 6–29)
ANION GAP SERPL CALCULATED.4IONS-SCNC: 8 MMOL/L (CALC) (ref 7–17)
AST SERPL-CCNC: 17 U/L (ref 10–35)
BILIRUB SERPL-MCNC: 0.6 MG/DL (ref 0.2–1.2)
BUN SERPL-MCNC: 15 MG/DL (ref 7–25)
CALCIUM SERPL-MCNC: 9.3 MG/DL (ref 8.6–10.4)
CHLORIDE SERPL-SCNC: 102 MMOL/L (ref 98–110)
CHOLEST SERPL-MCNC: 161 MG/DL
CHOLEST/HDLC SERPL: 2.3 (CALC)
CO2 SERPL-SCNC: 27 MMOL/L (ref 20–32)
CREAT SERPL-MCNC: 0.84 MG/DL (ref 0.5–1.03)
EGFRCR SERPLBLD CKD-EPI 2021: 82 ML/MIN/1.73M2
ERYTHROCYTE [DISTWIDTH] IN BLOOD BY AUTOMATED COUNT: 13.7 % (ref 11–15)
GLUCOSE SERPL-MCNC: 105 MG/DL (ref 65–99)
HCT VFR BLD AUTO: 39.3 % (ref 35–45)
HDLC SERPL-MCNC: 69 MG/DL
HGB BLD-MCNC: 12.9 G/DL (ref 11.7–15.5)
LDLC SERPL CALC-MCNC: 72 MG/DL (CALC)
MCH RBC QN AUTO: 29.9 PG (ref 27–33)
MCHC RBC AUTO-ENTMCNC: 32.8 G/DL (ref 32–36)
MCV RBC AUTO: 91 FL (ref 80–100)
MEV IGG SER IA-ACNC: >300 AU/ML
NONHDLC SERPL-MCNC: 92 MG/DL (CALC)
PLATELET # BLD AUTO: 332 THOUSAND/UL (ref 140–400)
PMV BLD REES-ECKER: 9.8 FL (ref 7.5–12.5)
POTASSIUM SERPL-SCNC: 4.8 MMOL/L (ref 3.5–5.3)
PROT SERPL-MCNC: 6.7 G/DL (ref 6.1–8.1)
RBC # BLD AUTO: 4.32 MILLION/UL (ref 3.8–5.1)
SODIUM SERPL-SCNC: 137 MMOL/L (ref 135–146)
TRIGL SERPL-MCNC: 115 MG/DL
TSH SERPL-ACNC: 1.09 MIU/L
VIT B12 SERPL-MCNC: 485 PG/ML (ref 200–1100)
WBC # BLD AUTO: 6.6 THOUSAND/UL (ref 3.8–10.8)

## 2025-07-18 DIAGNOSIS — R39.9 URINARY TRACT INFECTION SYMPTOMS: ICD-10-CM

## 2025-07-25 DIAGNOSIS — R39.9 URINARY TRACT INFECTION SYMPTOMS: ICD-10-CM

## 2025-08-01 DIAGNOSIS — R39.9 URINARY TRACT INFECTION SYMPTOMS: ICD-10-CM

## 2025-08-08 DIAGNOSIS — R39.9 URINARY TRACT INFECTION SYMPTOMS: ICD-10-CM

## 2025-08-15 DIAGNOSIS — R39.9 URINARY TRACT INFECTION SYMPTOMS: ICD-10-CM

## 2025-08-15 DIAGNOSIS — R11.0 NAUSEA: Primary | ICD-10-CM

## 2025-08-15 RX ORDER — ONDANSETRON 4 MG/1
4 TABLET, ORALLY DISINTEGRATING ORAL EVERY 8 HOURS PRN
Qty: 20 TABLET | Refills: 0 | Status: SHIPPED | OUTPATIENT
Start: 2025-08-15 | End: 2025-08-22

## 2025-08-22 DIAGNOSIS — R39.9 URINARY TRACT INFECTION SYMPTOMS: ICD-10-CM

## 2025-08-28 DIAGNOSIS — R73.01 IFG (IMPAIRED FASTING GLUCOSE): ICD-10-CM

## 2025-08-28 RX ORDER — METFORMIN HYDROCHLORIDE 500 MG/1
500 TABLET, EXTENDED RELEASE ORAL 3 TIMES DAILY
Qty: 180 TABLET | Refills: 0 | Status: SHIPPED | OUTPATIENT
Start: 2025-08-28

## 2025-08-29 DIAGNOSIS — R39.9 URINARY TRACT INFECTION SYMPTOMS: ICD-10-CM

## 2025-12-03 ENCOUNTER — APPOINTMENT (OUTPATIENT)
Dept: RADIOLOGY | Facility: CLINIC | Age: 55
End: 2025-12-03
Payer: COMMERCIAL

## 2026-07-10 ENCOUNTER — APPOINTMENT (OUTPATIENT)
Dept: PRIMARY CARE | Facility: CLINIC | Age: 56
End: 2026-07-10
Payer: COMMERCIAL

## 2026-08-06 ENCOUNTER — APPOINTMENT (OUTPATIENT)
Dept: OBSTETRICS AND GYNECOLOGY | Facility: CLINIC | Age: 56
End: 2026-08-06
Payer: COMMERCIAL